# Patient Record
Sex: FEMALE | Race: BLACK OR AFRICAN AMERICAN | NOT HISPANIC OR LATINO | Employment: OTHER | URBAN - NONMETROPOLITAN AREA
[De-identification: names, ages, dates, MRNs, and addresses within clinical notes are randomized per-mention and may not be internally consistent; named-entity substitution may affect disease eponyms.]

---

## 2017-07-25 PROBLEM — I35.0 AORTIC VALVE STENOSIS: Status: ACTIVE | Noted: 2017-07-25

## 2018-01-18 PROBLEM — R55 SYNCOPE: Status: ACTIVE | Noted: 2018-01-18

## 2020-05-12 ENCOUNTER — HISTORICAL (OUTPATIENT)
Dept: ADMINISTRATIVE | Facility: HOSPITAL | Age: 85
End: 2020-05-12

## 2020-05-12 LAB
ALBUMIN SERPL BCP-MCNC: 3.1 G/DL (ref 3.5–5)
ALBUMIN/GLOB SERPL ELPH: 0.7 {RATIO} (ref 1.5–2.2)
ALP SERPL-CCNC: 98 U/L (ref 45–117)
ALT SERPL W P-5'-P-CCNC: 45 U/L (ref 13–56)
ANION GAP SERPL CALC-SCNC: 14.2 MEQ/L (ref 10–20)
APPEARANCE, UA: CLEAR
AST SERPL-CCNC: 116 U/L (ref 15–37)
BACTERIA SPEC CULT: NEGATIVE /HPF
BASOPHILS NFR BLD: 0 10 (ref 0–0.1)
BASOPHILS NFR BLD: 0.1 % (ref 0–1.5)
BILIRUB SERPL-MCNC: 0.39 MG/DL (ref 0.2–1)
BILIRUB UR QL STRIP: NEGATIVE MG/DL
BUDDING YEAST: NORMAL /HPF
BUN SERPL-MCNC: 26 MG/DL (ref 7–18)
CALCIUM SERPL-MCNC: 9.2 MG/DL (ref 8.5–10.1)
CASTS, URINE MICROSCOPIC: NEGATIVE /LPF
CHLORIDE SERPL-SCNC: 91 MMOL/L (ref 98–107)
CO2 SERPL-SCNC: 23 MMOL/L (ref 22–32)
COLOR UR: YELLOW
COVID-19 INTERNAL CONTROL: NORMAL
CREAT SERPL-MCNC: 1.68 MG/DL (ref 0.55–1.02)
EGFR: 37 ML/MIN/1.73M
EOSINOPHIL NFR BLD: 0 10 (ref 0–0.7)
EOSINOPHIL NFR BLD: 0.1 % (ref 0–7)
EPITHELIAL, URINE MICROSCOPIC: NEGATIVE /HPF
ERYTHROCYTE [DISTWIDTH] IN BLOOD BY AUTOMATED COUNT: 12.4 % (ref 11.5–14.5)
GLOBULIN: 4.3 G/DL (ref 2.3–3.5)
GLUCOSE (UA): NEGATIVE MG/DL
GLUCOSE SERPL-MCNC: 102 MG/DL (ref 70–99)
GRAN #: 15.83 10 (ref 2–7.5)
GRAN%: 1.6 %
GRAN%: 85.3 % (ref 50–80)
HCT VFR BLD AUTO: 33.3 % (ref 37.7–47.9)
HGB BLD-MCNC: 11.3 G/DL (ref 12.2–16.2)
HGB UR QL STRIP: NEGATIVE ERY/UL
IMMATURE GRANULOCYTES #: 0.3 10
IPF: 4.9
KETONES UR QL STRIP: NEGATIVE MG/DL
LEUKOCYTE ESTERASE UR QL STRIP: NORMAL LEU/UL
LYMPH #: 1.5 10 (ref 1–3.5)
LYMPH%: 7.9 % (ref 12–50)
MCH RBC QN AUTO: 30.9 PG (ref 27–31)
MCHC RBC AUTO-ENTMCNC: 33.9 G% (ref 32–35)
MCV RBC AUTO: 91 FL (ref 80–97)
MONO #: 0.9 10 (ref 0–0.8)
MONO%: 5 % (ref 0–12)
NITRITE UR QL STRIP: NEGATIVE MG/DL
OSMOC: 253 MOSM/KG (ref 275–295)
PH UR STRIP: 5 [PH] (ref 5–7.5)
PMV BLD AUTO: 10 FL (ref 7.4–10.4)
PMV BLD AUTO: 123 10 (ref 142–424)
POTASSIUM SERPL-SCNC: 5.2 MMOL/L (ref 3.5–5.1)
PROT SERPL-MCNC: 7.4 G/DL (ref 6.4–8.2)
PROT UR QL STRIP: NEGATIVE MG/DL
RBC # BLD AUTO: 3.66 M/UL (ref 4.04–5.48)
RBC #/AREA URNS HPF: NEGATIVE /HPF
SARS-COV-2 RNA RESP QL NAA+PROBE: NOT DETECTED
SODIUM BLD-SCNC: 123 MMOL/L (ref 136–145)
SP GR UR STRIP: 1.01 (ref 1–1.03)
SPERM, URINE MICROSCOPIC: NORMAL /HPF
TYPE OF SPECIMEN  (UA): NORMAL
UNCLASSIFIED CRYSTALS, UA: NORMAL /HPF
UROBILINOGEN UR STRIP-ACNC: NORMAL EU/L
WBC # BLD AUTO: 18.6 10 (ref 4–10.2)
WBC #/AREA URNS HPF: 8 /HPF

## 2020-05-13 LAB
ANION GAP SERPL CALC-SCNC: 14.7 MEQ/L (ref 10–20)
BASOPHILS NFR BLD: 0 10 (ref 0–0.1)
BASOPHILS NFR BLD: 0.1 % (ref 0–1.5)
BUN SERPL-MCNC: 30 MG/DL (ref 7–18)
CALCIUM SERPL-MCNC: 8.6 MG/DL (ref 8.5–10.1)
CHLORIDE SERPL-SCNC: 98 MMOL/L (ref 98–107)
CO2 SERPL-SCNC: 21 MMOL/L (ref 22–32)
CREAT SERPL-MCNC: 1.66 MG/DL (ref 0.55–1.02)
EGFR: 38 ML/MIN/1.73M
EOSINOPHIL NFR BLD: 0 10 (ref 0–0.7)
EOSINOPHIL NFR BLD: 0.1 % (ref 0–7)
ERYTHROCYTE [DISTWIDTH] IN BLOOD BY AUTOMATED COUNT: 12.4 % (ref 11.5–14.5)
GLUCOSE SERPL-MCNC: 92 MG/DL (ref 70–99)
GRAN #: 12.66 10 (ref 2–7.5)
GRAN%: 1.4 %
GRAN%: 66.9 % (ref 50–80)
HCT VFR BLD AUTO: 28.6 % (ref 37.7–47.9)
HGB BLD-MCNC: 9.8 G/DL (ref 12.2–16.2)
IMMATURE GRANULOCYTES #: 0.27 10
IPF: 5.7
LYMPH #: 3.8 10 (ref 1–3.5)
LYMPH%: 20.3 % (ref 12–50)
MCH RBC QN AUTO: 31.4 PG (ref 27–31)
MCHC RBC AUTO-ENTMCNC: 34.3 G% (ref 32–35)
MCV RBC AUTO: 91.7 FL (ref 80–97)
MONO #: 2.1 10 (ref 0–0.8)
MONO%: 11.2 % (ref 0–12)
OSMOC: 265 MOSM/KG (ref 275–295)
PMV BLD AUTO: 10.5 FL (ref 7.4–10.4)
PMV BLD AUTO: 114 10 (ref 142–424)
POTASSIUM SERPL-SCNC: 4.7 MMOL/L (ref 3.5–5.1)
RBC # BLD AUTO: 3.12 M/UL (ref 4.04–5.48)
SODIUM BLD-SCNC: 129 MMOL/L (ref 136–145)
WBC # BLD AUTO: 18.9 10 (ref 4–10.2)

## 2020-05-14 LAB
ALBUMIN SERPL BCP-MCNC: 2.6 G/DL (ref 3.5–5)
ALBUMIN/GLOB SERPL ELPH: 0.7 {RATIO} (ref 1.5–2.2)
ALP SERPL-CCNC: 73 U/L (ref 45–117)
ALT SERPL W P-5'-P-CCNC: 39 U/L (ref 13–56)
ANION GAP SERPL CALC-SCNC: 13.4 MEQ/L (ref 10–20)
AST SERPL-CCNC: 103 U/L (ref 15–37)
BASOPHILS NFR BLD: 0 10 (ref 0–0.1)
BASOPHILS NFR BLD: 0.1 % (ref 0–1.5)
BILIRUB SERPL-MCNC: 0.3 MG/DL (ref 0.2–1)
BUN SERPL-MCNC: 28 MG/DL (ref 7–18)
CALCIUM SERPL-MCNC: 9.2 MG/DL (ref 8.5–10.1)
CHLORIDE SERPL-SCNC: 98 MMOL/L (ref 98–107)
CO2 SERPL-SCNC: 23 MMOL/L (ref 22–32)
CREAT SERPL-MCNC: 1.21 MG/DL (ref 0.55–1.02)
EGFR: 55 ML/MIN/1.73M
EOSINOPHIL NFR BLD: 0 10 (ref 0–0.7)
EOSINOPHIL NFR BLD: 0.2 % (ref 0–7)
ERYTHROCYTE [DISTWIDTH] IN BLOOD BY AUTOMATED COUNT: 12.5 % (ref 11.5–14.5)
GLOBULIN: 3.7 G/DL (ref 2.3–3.5)
GLUCOSE SERPL-MCNC: 84 MG/DL (ref 70–99)
GRAN #: 11.16 10 (ref 2–7.5)
GRAN%: 1.2 %
GRAN%: 75 % (ref 50–80)
HCT VFR BLD AUTO: 30.3 % (ref 37.7–47.9)
HGB BLD-MCNC: 10 G/DL (ref 12.2–16.2)
IMMATURE GRANULOCYTES #: 0.18 10
IPF: 5.3
LYMPH #: 2.1 10 (ref 1–3.5)
LYMPH%: 14 % (ref 12–50)
MAGNESIUM SERPL-MCNC: 2.18 MG/DL (ref 1.8–2.4)
MCH RBC QN AUTO: 30.7 PG (ref 27–31)
MCHC RBC AUTO-ENTMCNC: 33 G% (ref 32–35)
MCV RBC AUTO: 92.9 FL (ref 80–97)
MONO #: 1.4 10 (ref 0–0.8)
MONO%: 9.5 % (ref 0–12)
OSMOC: 266 MOSM/KG (ref 275–295)
PMV BLD AUTO: 10.6 FL (ref 7.4–10.4)
PMV BLD AUTO: 104 10 (ref 142–424)
POTASSIUM SERPL-SCNC: 4.4 MMOL/L (ref 3.5–5.1)
PROT SERPL-MCNC: 6.3 G/DL (ref 6.4–8.2)
RBC # BLD AUTO: 3.26 M/UL (ref 4.04–5.48)
SODIUM BLD-SCNC: 130 MMOL/L (ref 136–145)
WBC # BLD AUTO: 14.9 10 (ref 4–10.2)

## 2020-05-15 LAB
ALBUMIN SERPL BCP-MCNC: 2.5 G/DL (ref 3.5–5)
ALBUMIN/GLOB SERPL ELPH: 0.7 {RATIO} (ref 1.5–2.2)
ALP SERPL-CCNC: 72 U/L (ref 45–117)
ALT SERPL W P-5'-P-CCNC: 37 U/L (ref 13–56)
ANION GAP SERPL CALC-SCNC: 12.6 MEQ/L (ref 10–20)
AST SERPL-CCNC: 75 U/L (ref 15–37)
BASOPHILS NFR BLD: 0 10 (ref 0–0.1)
BASOPHILS NFR BLD: 0.1 % (ref 0–1.5)
BILIRUB SERPL-MCNC: 0.3 MG/DL (ref 0.2–1)
BUN SERPL-MCNC: 28 MG/DL (ref 7–18)
CALCIUM SERPL-MCNC: 8.6 MG/DL (ref 8.5–10.1)
CHLORIDE SERPL-SCNC: 99 MMOL/L (ref 98–107)
CO2 SERPL-SCNC: 22 MMOL/L (ref 22–32)
CREAT SERPL-MCNC: 1.2 MG/DL (ref 0.55–1.02)
EGFR: 55 ML/MIN/1.73M
EOSINOPHIL NFR BLD: 0 10 (ref 0–0.7)
EOSINOPHIL NFR BLD: 0.2 % (ref 0–7)
ERYTHROCYTE [DISTWIDTH] IN BLOOD BY AUTOMATED COUNT: 12.6 % (ref 11.5–14.5)
GLOBULIN: 3.6 G/DL (ref 2.3–3.5)
GLUCOSE SERPL-MCNC: 86 MG/DL (ref 70–99)
GRAN #: 10.58 10 (ref 2–7.5)
GRAN%: 1.1 %
GRAN%: 71.4 % (ref 50–80)
HCT VFR BLD AUTO: 28.9 % (ref 37.7–47.9)
HGB BLD-MCNC: 9.6 G/DL (ref 12.2–16.2)
IMMATURE GRANULOCYTES #: 0.17 10
IPF: 4.2
LYMPH #: 2.4 10 (ref 1–3.5)
LYMPH%: 16.5 % (ref 12–50)
MAGNESIUM SERPL-MCNC: 2.06 MG/DL (ref 1.8–2.4)
MCH RBC QN AUTO: 30.9 PG (ref 27–31)
MCHC RBC AUTO-ENTMCNC: 33.2 G% (ref 32–35)
MCV RBC AUTO: 92.9 FL (ref 80–97)
MONO #: 1.6 10 (ref 0–0.8)
MONO%: 10.7 % (ref 0–12)
OSMOC: 264 MOSM/KG (ref 275–295)
PMV BLD AUTO: 10.9 FL (ref 7.4–10.4)
PMV BLD AUTO: 105 10 (ref 142–424)
POTASSIUM SERPL-SCNC: 4.6 MMOL/L (ref 3.5–5.1)
PROT SERPL-MCNC: 6.1 G/DL (ref 6.4–8.2)
RBC # BLD AUTO: 3.11 M/UL (ref 4.04–5.48)
SODIUM BLD-SCNC: 129 MMOL/L (ref 136–145)
WBC # BLD AUTO: 14.8 10 (ref 4–10.2)

## 2020-05-16 LAB
ALBUMIN SERPL BCP-MCNC: 2.3 G/DL (ref 3.5–5)
ALBUMIN/GLOB SERPL ELPH: 0.7 {RATIO} (ref 1.5–2.2)
ALP SERPL-CCNC: 65 U/L (ref 45–117)
ALT SERPL W P-5'-P-CCNC: 38 U/L (ref 13–56)
ANION GAP SERPL CALC-SCNC: 11.6 MEQ/L (ref 10–20)
AST SERPL-CCNC: 60 U/L (ref 15–37)
BASOPHILS NFR BLD: 0 10 (ref 0–0.1)
BASOPHILS NFR BLD: 0.2 % (ref 0–1.5)
BILIRUB SERPL-MCNC: 0.32 MG/DL (ref 0.2–1)
BUN SERPL-MCNC: 26 MG/DL (ref 7–18)
CALCIUM SERPL-MCNC: 8.5 MG/DL (ref 8.5–10.1)
CHLORIDE SERPL-SCNC: 103 MMOL/L (ref 98–107)
CO2 SERPL-SCNC: 23 MMOL/L (ref 22–32)
CREAT SERPL-MCNC: 1.09 MG/DL (ref 0.55–1.02)
EGFR: 62 ML/MIN/1.73M
EOSINOPHIL NFR BLD: 0 10 (ref 0–0.7)
EOSINOPHIL NFR BLD: 0.2 % (ref 0–7)
ERYTHROCYTE [DISTWIDTH] IN BLOOD BY AUTOMATED COUNT: 12.7 % (ref 11.5–14.5)
GLOBULIN: 3.4 G/DL (ref 2.3–3.5)
GLUCOSE SERPL-MCNC: 78 MG/DL (ref 70–99)
GRAN #: 8.68 10 (ref 2–7.5)
GRAN%: 1.7 %
GRAN%: 70.9 % (ref 50–80)
HCT VFR BLD AUTO: 26.8 % (ref 37.7–47.9)
HGB BLD-MCNC: 8.9 G/DL (ref 12.2–16.2)
IMMATURE GRANULOCYTES #: 0.21 10
IPF: 3.6
LYMPH #: 2 10 (ref 1–3.5)
LYMPH%: 16.6 % (ref 12–50)
MAGNESIUM SERPL-MCNC: 2.17 MG/DL (ref 1.8–2.4)
MCH RBC QN AUTO: 31.2 PG (ref 27–31)
MCHC RBC AUTO-ENTMCNC: 33.2 G% (ref 32–35)
MCV RBC AUTO: 94 FL (ref 80–97)
MONO #: 1.3 10 (ref 0–0.8)
MONO%: 10.4 % (ref 0–12)
OSMOC: 270 MOSM/KG (ref 275–295)
PMV BLD AUTO: 10.8 FL (ref 7.4–10.4)
PMV BLD AUTO: 97 10 (ref 142–424)
POTASSIUM SERPL-SCNC: 4.6 MMOL/L (ref 3.5–5.1)
PROT SERPL-MCNC: 5.7 G/DL (ref 6.4–8.2)
RBC # BLD AUTO: 2.85 M/UL (ref 4.04–5.48)
SODIUM BLD-SCNC: 133 MMOL/L (ref 136–145)
WBC # BLD AUTO: 12.3 10 (ref 4–10.2)

## 2020-09-11 ENCOUNTER — APPOINTMENT (OUTPATIENT)
Dept: LAB | Facility: HOSPITAL | Age: 85
End: 2020-09-11
Attending: PODIATRIST
Payer: MEDICARE

## 2020-09-11 DIAGNOSIS — T81.30XA WOUND DISRUPTION: Primary | ICD-10-CM

## 2020-09-11 DIAGNOSIS — I10 ESSENTIAL HYPERTENSION, MALIGNANT: ICD-10-CM

## 2020-09-11 PROCEDURE — 87070 CULTURE OTHR SPECIMN AEROBIC: CPT

## 2020-09-14 LAB — BACTERIA SPEC AEROBE CULT: NORMAL

## 2020-11-21 ENCOUNTER — APPOINTMENT (OUTPATIENT)
Dept: LAB | Facility: HOSPITAL | Age: 85
End: 2020-11-21
Attending: INTERNAL MEDICINE
Payer: MEDICARE

## 2020-11-21 DIAGNOSIS — R30.0 DYSURIA: Primary | ICD-10-CM

## 2020-11-21 LAB
BACTERIA #/AREA URNS HPF: NEGATIVE /HPF
BILIRUB UR QL STRIP: NEGATIVE
CLARITY UR: CLEAR
COLOR UR: YELLOW
GLUCOSE UR QL STRIP: NEGATIVE
HGB UR QL STRIP: NEGATIVE
HYALINE CASTS #/AREA URNS LPF: 0 /LPF
KETONES UR QL STRIP: NEGATIVE
LEUKOCYTE ESTERASE UR QL STRIP: ABNORMAL
MICROSCOPIC COMMENT: ABNORMAL
NITRITE UR QL STRIP: NEGATIVE
PH UR STRIP: 6 [PH] (ref 5–8)
PROT UR QL STRIP: NEGATIVE
RBC #/AREA URNS HPF: 1 /HPF (ref 0–4)
SP GR UR STRIP: 1.01 (ref 1–1.03)
SQUAMOUS #/AREA URNS HPF: 3 /HPF
URN SPEC COLLECT METH UR: ABNORMAL
UROBILINOGEN UR STRIP-ACNC: 1 EU/DL
WBC #/AREA URNS HPF: 24 /HPF (ref 0–5)

## 2020-11-21 PROCEDURE — 81000 URINALYSIS NONAUTO W/SCOPE: CPT

## 2020-11-30 ENCOUNTER — HOSPITAL ENCOUNTER (EMERGENCY)
Facility: HOSPITAL | Age: 85
Discharge: HOME | End: 2020-11-30
Attending: EMERGENCY MEDICINE
Payer: MEDICARE

## 2020-11-30 VITALS
RESPIRATION RATE: 18 BRPM | HEIGHT: 64 IN | OXYGEN SATURATION: 99 % | SYSTOLIC BLOOD PRESSURE: 138 MMHG | DIASTOLIC BLOOD PRESSURE: 65 MMHG | WEIGHT: 115 LBS | TEMPERATURE: 98 F | HEART RATE: 69 BPM | BODY MASS INDEX: 19.63 KG/M2

## 2020-11-30 DIAGNOSIS — E87.1 CHRONIC HYPONATREMIA: ICD-10-CM

## 2020-11-30 DIAGNOSIS — E86.0 DEHYDRATION: Primary | ICD-10-CM

## 2020-11-30 LAB
ALBUMIN SERPL BCP-MCNC: 2.4 G/DL (ref 3.5–5.2)
ALP SERPL-CCNC: 67 U/L (ref 55–135)
ALT SERPL W/O P-5'-P-CCNC: 21 U/L (ref 10–44)
ANION GAP SERPL CALC-SCNC: 6 MMOL/L (ref 8–16)
AST SERPL-CCNC: 19 U/L (ref 10–40)
BACTERIA #/AREA URNS HPF: NEGATIVE /HPF
BASOPHILS # BLD AUTO: 0.03 K/UL (ref 0–0.2)
BASOPHILS NFR BLD: 0.3 % (ref 0–1.9)
BILIRUB SERPL-MCNC: 0.3 MG/DL (ref 0.1–1)
BILIRUB UR QL STRIP: NEGATIVE
BUN SERPL-MCNC: 16 MG/DL (ref 8–23)
CALCIUM SERPL-MCNC: 8.7 MG/DL (ref 8.7–10.5)
CHLORIDE SERPL-SCNC: 93 MMOL/L (ref 95–110)
CLARITY UR: CLEAR
CO2 SERPL-SCNC: 27 MMOL/L (ref 23–29)
COLOR UR: YELLOW
CREAT SERPL-MCNC: 1 MG/DL (ref 0.5–1.4)
DIFFERENTIAL METHOD: ABNORMAL
EOSINOPHIL # BLD AUTO: 0.1 K/UL (ref 0–0.5)
EOSINOPHIL NFR BLD: 0.4 % (ref 0–8)
ERYTHROCYTE [DISTWIDTH] IN BLOOD BY AUTOMATED COUNT: 13.3 % (ref 11.5–14.5)
EST. GFR  (AFRICAN AMERICAN): 59.8 ML/MIN/1.73 M^2
EST. GFR  (NON AFRICAN AMERICAN): 51.9 ML/MIN/1.73 M^2
GLUCOSE SERPL-MCNC: 103 MG/DL (ref 70–110)
GLUCOSE UR QL STRIP: NEGATIVE
HCT VFR BLD AUTO: 32.8 % (ref 37–48.5)
HGB BLD-MCNC: 10.7 G/DL (ref 12–16)
HGB UR QL STRIP: NEGATIVE
HYALINE CASTS #/AREA URNS LPF: 0 /LPF
IMM GRANULOCYTES # BLD AUTO: 0.28 K/UL (ref 0–0.04)
IMM GRANULOCYTES NFR BLD AUTO: 2.4 % (ref 0–0.5)
KETONES UR QL STRIP: NEGATIVE
LEUKOCYTE ESTERASE UR QL STRIP: ABNORMAL
LYMPHOCYTES # BLD AUTO: 1.9 K/UL (ref 1–4.8)
LYMPHOCYTES NFR BLD: 16.3 % (ref 18–48)
MCH RBC QN AUTO: 28.7 PG (ref 27–31)
MCHC RBC AUTO-ENTMCNC: 32.6 G/DL (ref 32–36)
MCV RBC AUTO: 88 FL (ref 82–98)
MICROSCOPIC COMMENT: ABNORMAL
MONOCYTES # BLD AUTO: 0.7 K/UL (ref 0.3–1)
MONOCYTES NFR BLD: 6 % (ref 4–15)
NEUTROPHILS # BLD AUTO: 8.7 K/UL (ref 1.8–7.7)
NEUTROPHILS NFR BLD: 74.6 % (ref 38–73)
NITRITE UR QL STRIP: NEGATIVE
NRBC BLD-RTO: 0 /100 WBC
PH UR STRIP: >8 [PH] (ref 5–8)
PLATELET # BLD AUTO: 190 K/UL (ref 150–350)
PMV BLD AUTO: 9.7 FL (ref 9.2–12.9)
POTASSIUM SERPL-SCNC: 3.9 MMOL/L (ref 3.5–5.1)
PROT SERPL-MCNC: 6.2 G/DL (ref 6–8.4)
PROT UR QL STRIP: NEGATIVE
RBC # BLD AUTO: 3.73 M/UL (ref 4–5.4)
RBC #/AREA URNS HPF: 4 /HPF (ref 0–4)
SODIUM SERPL-SCNC: 126 MMOL/L (ref 136–145)
SP GR UR STRIP: 1.01 (ref 1–1.03)
SQUAMOUS #/AREA URNS HPF: 1 /HPF
URN SPEC COLLECT METH UR: ABNORMAL
UROBILINOGEN UR STRIP-ACNC: NEGATIVE EU/DL
WBC # BLD AUTO: 11.59 K/UL (ref 3.9–12.7)
WBC #/AREA URNS HPF: 11 /HPF (ref 0–5)

## 2020-11-30 PROCEDURE — 99284 EMERGENCY DEPT VISIT MOD MDM: CPT | Mod: 25

## 2020-11-30 PROCEDURE — 87086 URINE CULTURE/COLONY COUNT: CPT

## 2020-11-30 PROCEDURE — 96360 HYDRATION IV INFUSION INIT: CPT

## 2020-11-30 PROCEDURE — 85025 COMPLETE CBC W/AUTO DIFF WBC: CPT

## 2020-11-30 PROCEDURE — 25000003 PHARM REV CODE 250: Performed by: CLINICAL NURSE SPECIALIST

## 2020-11-30 PROCEDURE — 36415 COLL VENOUS BLD VENIPUNCTURE: CPT

## 2020-11-30 PROCEDURE — 80053 COMPREHEN METABOLIC PANEL: CPT

## 2020-11-30 PROCEDURE — 81000 URINALYSIS NONAUTO W/SCOPE: CPT

## 2020-11-30 RX ORDER — ASPIRIN AND DIPYRIDAMOLE 25; 200 MG/1; MG/1
1 CAPSULE, EXTENDED RELEASE ORAL 2 TIMES DAILY
COMMUNITY
End: 2022-01-01 | Stop reason: SDUPTHER

## 2020-11-30 RX ORDER — ATORVASTATIN CALCIUM 40 MG/1
40 TABLET, FILM COATED ORAL DAILY
COMMUNITY
End: 2022-01-01

## 2020-11-30 RX ORDER — CEFUROXIME AXETIL 250 MG/1
250 TABLET ORAL 2 TIMES DAILY
COMMUNITY
End: 2022-01-01 | Stop reason: SDUPTHER

## 2020-11-30 RX ORDER — VENLAFAXINE 37.5 MG/1
TABLET ORAL 2 TIMES DAILY
COMMUNITY

## 2020-11-30 RX ADMIN — SODIUM CHLORIDE 1000 ML: 0.9 INJECTION, SOLUTION INTRAVENOUS at 11:11

## 2020-11-30 NOTE — ED PROVIDER NOTES
Encounter Date: 11/30/2020       History     Chief Complaint   Patient presents with    Weakness     for a week, on abx for UTI, pt is aaox3, unable to stand, denies pain, vss, family feels that she is dehydrated     85-year-old female presents to the ER with her son for complaints of weakness, fatigue for the last week.   has her on medication for UTI (cefuroxime).  Patient has a feeding tube status seeing care by the son and daughter.  Tolerating feeding.  Reporting some diarrhea.  Vital signs stable, no complaints from patient        Review of patient's allergies indicates:   Allergen Reactions    Aspirin Nausea Only     Past Medical History:   Diagnosis Date    Anemia     Anticoagulant long-term use     Anxiety     Aortic stenosis     Arthritis     Bilateral carotid artery disease     Depression     Encounter for blood transfusion     Hemorrhoids     TIA (transient ischemic attack)      Past Surgical History:   Procedure Laterality Date    HYSTERECTOMY       Family History   Problem Relation Age of Onset    Cancer Mother      Social History     Tobacco Use    Smoking status: Former Smoker    Smokeless tobacco: Never Used   Substance Use Topics    Alcohol use: No    Drug use: No     Review of Systems   Constitutional: Positive for activity change, appetite change and fatigue. Negative for fever.   HENT: Negative for sore throat.    Respiratory: Negative for shortness of breath.    Cardiovascular: Negative for chest pain.   Gastrointestinal: Positive for diarrhea. Negative for nausea.   Genitourinary: Negative for dysuria.   Musculoskeletal: Positive for arthralgias and gait problem. Negative for back pain.   Skin: Negative for rash.   Neurological: Positive for weakness.   Hematological: Does not bruise/bleed easily.   All other systems reviewed and are negative.      Physical Exam     Initial Vitals [11/30/20 1058]   BP Pulse Resp Temp SpO2   139/63 87 16 97.9 °F (36.6 °C) 99 %       MAP       --         Physical Exam    Nursing note and vitals reviewed.  Constitutional: She appears well-developed and well-nourished.   HENT:   Head: Normocephalic and atraumatic.   Eyes: Pupils are equal, round, and reactive to light.   Neck: Normal range of motion.   Cardiovascular: Normal rate and regular rhythm.   Pulmonary/Chest: Breath sounds normal.   Abdominal: Soft. Bowel sounds are normal.   Musculoskeletal: Normal range of motion.   Neurological: She is alert and oriented to person, place, and time.   Skin: Skin is warm and dry.   Psychiatric: She has a normal mood and affect.         ED Course   Procedures  Labs Reviewed   URINALYSIS, REFLEX TO URINE CULTURE - Abnormal; Notable for the following components:       Result Value    pH, UA >8.0 (*)     Leukocytes, UA 2+ (*)     All other components within normal limits    Narrative:     Specimen Source->Urine   CBC W/ AUTO DIFFERENTIAL - Abnormal; Notable for the following components:    RBC 3.73 (*)     Hemoglobin 10.7 (*)     Hematocrit 32.8 (*)     Immature Granulocytes 2.4 (*)     Gran # (ANC) 8.7 (*)     Immature Grans (Abs) 0.28 (*)     Gran % 74.6 (*)     Lymph % 16.3 (*)     All other components within normal limits   COMPREHENSIVE METABOLIC PANEL - Abnormal; Notable for the following components:    Sodium 126 (*)     Chloride 93 (*)     Albumin 2.4 (*)     Anion Gap 6 (*)     eGFR if  59.8 (*)     eGFR if non  51.9 (*)     All other components within normal limits   URINALYSIS MICROSCOPIC - Abnormal; Notable for the following components:    WBC, UA 11 (*)     All other components within normal limits    Narrative:     Specimen Source->Urine   CULTURE, URINE          Imaging Results    None          Medical Decision Making:   Differential Diagnosis:   Dehydration, UTI  Clinical Tests:   Lab Tests: Ordered and Reviewed                             Clinical Impression:     ICD-10-CM ICD-9-CM   1. Dehydration  E86.0  276.51   2. Chronic hyponatremia  E87.1 276.1                          ED Disposition Condition    Discharge Stable        ED Prescriptions     None        Follow-up Information    None                                      Maryellen Diez NP  11/30/20 5103

## 2020-12-02 LAB — BACTERIA UR CULT: NO GROWTH

## 2020-12-29 ENCOUNTER — APPOINTMENT (OUTPATIENT)
Dept: LAB | Facility: HOSPITAL | Age: 85
End: 2020-12-29
Attending: INTERNAL MEDICINE
Payer: MEDICARE

## 2020-12-29 DIAGNOSIS — N39.0 URINARY TRACT INFECTION, SITE NOT SPECIFIED: Primary | ICD-10-CM

## 2020-12-29 LAB
BACTERIA #/AREA URNS HPF: NEGATIVE /HPF
BILIRUB UR QL STRIP: NEGATIVE
CLARITY UR: CLEAR
COLOR UR: YELLOW
GLUCOSE UR QL STRIP: NEGATIVE
HGB UR QL STRIP: NEGATIVE
HYALINE CASTS #/AREA URNS LPF: 0 /LPF
KETONES UR QL STRIP: NEGATIVE
LEUKOCYTE ESTERASE UR QL STRIP: ABNORMAL
MICROSCOPIC COMMENT: ABNORMAL
NITRITE UR QL STRIP: NEGATIVE
PH UR STRIP: >8 [PH] (ref 5–8)
PROT UR QL STRIP: NEGATIVE
RBC #/AREA URNS HPF: 2 /HPF (ref 0–4)
SP GR UR STRIP: 1.01 (ref 1–1.03)
SQUAMOUS #/AREA URNS HPF: 1 /HPF
URN SPEC COLLECT METH UR: ABNORMAL
UROBILINOGEN UR STRIP-ACNC: 1 EU/DL
WBC #/AREA URNS HPF: 7 /HPF (ref 0–5)

## 2020-12-29 PROCEDURE — 81000 URINALYSIS NONAUTO W/SCOPE: CPT

## 2021-04-14 ENCOUNTER — APPOINTMENT (OUTPATIENT)
Dept: LAB | Facility: HOSPITAL | Age: 86
End: 2021-04-14
Attending: INTERNAL MEDICINE
Payer: MEDICARE

## 2021-04-14 DIAGNOSIS — N39.0 URINARY TRACT INFECTION, SITE NOT SPECIFIED: Primary | ICD-10-CM

## 2021-04-14 LAB
BACTERIA #/AREA URNS HPF: NEGATIVE /HPF
BILIRUB UR QL STRIP: NEGATIVE
CLARITY UR: CLEAR
COLOR UR: YELLOW
GLUCOSE UR QL STRIP: NEGATIVE
HGB UR QL STRIP: NEGATIVE
HYALINE CASTS #/AREA URNS LPF: 0 /LPF
KETONES UR QL STRIP: NEGATIVE
LEUKOCYTE ESTERASE UR QL STRIP: ABNORMAL
MICROSCOPIC COMMENT: ABNORMAL
NITRITE UR QL STRIP: NEGATIVE
PH UR STRIP: 8 [PH] (ref 5–8)
PROT UR QL STRIP: NEGATIVE
RBC #/AREA URNS HPF: 4 /HPF (ref 0–4)
SP GR UR STRIP: 1.01 (ref 1–1.03)
SQUAMOUS #/AREA URNS HPF: 1 /HPF
URN SPEC COLLECT METH UR: ABNORMAL
UROBILINOGEN UR STRIP-ACNC: NEGATIVE EU/DL
WBC #/AREA URNS HPF: 9 /HPF (ref 0–5)

## 2021-04-14 PROCEDURE — 81000 URINALYSIS NONAUTO W/SCOPE: CPT | Performed by: INTERNAL MEDICINE

## 2021-04-14 PROCEDURE — 87086 URINE CULTURE/COLONY COUNT: CPT | Performed by: INTERNAL MEDICINE

## 2021-04-16 LAB — BACTERIA UR CULT: NO GROWTH

## 2021-05-18 ENCOUNTER — APPOINTMENT (OUTPATIENT)
Dept: LAB | Facility: HOSPITAL | Age: 86
End: 2021-05-18
Attending: INTERNAL MEDICINE
Payer: MEDICARE

## 2021-05-18 DIAGNOSIS — N39.0 URINARY TRACT INFECTION, SITE NOT SPECIFIED: Primary | ICD-10-CM

## 2021-05-18 LAB
BACTERIA #/AREA URNS HPF: NEGATIVE /HPF
BILIRUB UR QL STRIP: NEGATIVE
CLARITY UR: CLEAR
COLOR UR: YELLOW
GLUCOSE UR QL STRIP: NEGATIVE
HGB UR QL STRIP: NEGATIVE
HYALINE CASTS #/AREA URNS LPF: 1 /LPF
KETONES UR QL STRIP: NEGATIVE
LEUKOCYTE ESTERASE UR QL STRIP: ABNORMAL
MICROSCOPIC COMMENT: ABNORMAL
NITRITE UR QL STRIP: NEGATIVE
PH UR STRIP: 6 [PH] (ref 5–8)
PROT UR QL STRIP: NEGATIVE
RBC #/AREA URNS HPF: 1 /HPF (ref 0–4)
SP GR UR STRIP: 1.01 (ref 1–1.03)
SQUAMOUS #/AREA URNS HPF: 1 /HPF
URN SPEC COLLECT METH UR: ABNORMAL
UROBILINOGEN UR STRIP-ACNC: NEGATIVE EU/DL
WBC #/AREA URNS HPF: 34 /HPF (ref 0–5)

## 2021-05-18 PROCEDURE — 87086 URINE CULTURE/COLONY COUNT: CPT | Performed by: INTERNAL MEDICINE

## 2021-05-18 PROCEDURE — 81000 URINALYSIS NONAUTO W/SCOPE: CPT | Performed by: INTERNAL MEDICINE

## 2021-05-19 LAB — BACTERIA UR CULT: NO GROWTH

## 2021-05-25 ENCOUNTER — LAB VISIT (OUTPATIENT)
Dept: LAB | Facility: HOSPITAL | Age: 86
End: 2021-05-25
Attending: INTERNAL MEDICINE
Payer: MEDICARE

## 2021-05-25 DIAGNOSIS — I10 ESSENTIAL HYPERTENSION, MALIGNANT: Primary | ICD-10-CM

## 2021-05-25 DIAGNOSIS — E87.6 HYPOPOTASSEMIA: ICD-10-CM

## 2021-05-25 DIAGNOSIS — D64.9 ANEMIA, UNSPECIFIED: ICD-10-CM

## 2021-05-25 DIAGNOSIS — E55.9 AVITAMINOSIS D: ICD-10-CM

## 2021-05-25 DIAGNOSIS — E78.00 PURE HYPERCHOLESTEROLEMIA: ICD-10-CM

## 2021-05-25 LAB
25(OH)D3+25(OH)D2 SERPL-MCNC: 49 NG/ML (ref 30–96)
ALBUMIN SERPL BCP-MCNC: 3.3 G/DL (ref 3.5–5.2)
ALP SERPL-CCNC: 73 U/L (ref 55–135)
ALT SERPL W/O P-5'-P-CCNC: 38 U/L (ref 10–44)
ANION GAP SERPL CALC-SCNC: 9 MMOL/L (ref 8–16)
AST SERPL-CCNC: 43 U/L (ref 10–40)
BASOPHILS # BLD AUTO: 0.02 K/UL (ref 0–0.2)
BASOPHILS NFR BLD: 0.2 % (ref 0–1.9)
BILIRUB SERPL-MCNC: 0.5 MG/DL (ref 0.1–1)
BUN SERPL-MCNC: 23 MG/DL (ref 8–23)
CALCIUM SERPL-MCNC: 9.4 MG/DL (ref 8.7–10.5)
CHLORIDE SERPL-SCNC: 94 MMOL/L (ref 95–110)
CHOLEST SERPL-MCNC: 220 MG/DL (ref 120–199)
CHOLEST/HDLC SERPL: 2.5 {RATIO} (ref 2–5)
CO2 SERPL-SCNC: 26 MMOL/L (ref 23–29)
CREAT SERPL-MCNC: 1.3 MG/DL (ref 0.5–1.4)
DIFFERENTIAL METHOD: ABNORMAL
EOSINOPHIL # BLD AUTO: 0 K/UL (ref 0–0.5)
EOSINOPHIL NFR BLD: 0.1 % (ref 0–8)
ERYTHROCYTE [DISTWIDTH] IN BLOOD BY AUTOMATED COUNT: 14.9 % (ref 11.5–14.5)
EST. GFR  (AFRICAN AMERICAN): 43.2 ML/MIN/1.73 M^2
EST. GFR  (NON AFRICAN AMERICAN): 37.5 ML/MIN/1.73 M^2
GLUCOSE SERPL-MCNC: 104 MG/DL (ref 70–110)
HCT VFR BLD AUTO: 38.3 % (ref 37–48.5)
HDLC SERPL-MCNC: 89 MG/DL (ref 40–75)
HDLC SERPL: 40.5 % (ref 20–50)
HGB BLD-MCNC: 12.7 G/DL (ref 12–16)
IMM GRANULOCYTES # BLD AUTO: 0.19 K/UL (ref 0–0.04)
IMM GRANULOCYTES NFR BLD AUTO: 1.9 % (ref 0–0.5)
LDLC SERPL CALC-MCNC: 102.4 MG/DL (ref 63–159)
LYMPHOCYTES # BLD AUTO: 1.4 K/UL (ref 1–4.8)
LYMPHOCYTES NFR BLD: 13.9 % (ref 18–48)
MCH RBC QN AUTO: 29.7 PG (ref 27–31)
MCHC RBC AUTO-ENTMCNC: 33.2 G/DL (ref 32–36)
MCV RBC AUTO: 90 FL (ref 82–98)
MONOCYTES # BLD AUTO: 0.9 K/UL (ref 0.3–1)
MONOCYTES NFR BLD: 9.2 % (ref 4–15)
NEUTROPHILS # BLD AUTO: 7.3 K/UL (ref 1.8–7.7)
NEUTROPHILS NFR BLD: 74.7 % (ref 38–73)
NONHDLC SERPL-MCNC: 131 MG/DL
NRBC BLD-RTO: 0 /100 WBC
PLATELET # BLD AUTO: 110 K/UL (ref 150–450)
PMV BLD AUTO: 11.2 FL (ref 9.2–12.9)
POTASSIUM SERPL-SCNC: 4.3 MMOL/L (ref 3.5–5.1)
PROT SERPL-MCNC: 6.7 G/DL (ref 6–8.4)
RBC # BLD AUTO: 4.27 M/UL (ref 4–5.4)
SODIUM SERPL-SCNC: 129 MMOL/L (ref 136–145)
T4 FREE SERPL-MCNC: 1.52 NG/DL (ref 0.71–1.51)
TRIGL SERPL-MCNC: 143 MG/DL (ref 30–150)
TSH SERPL DL<=0.005 MIU/L-ACNC: 4.09 UIU/ML (ref 0.4–4)
WBC # BLD AUTO: 9.77 K/UL (ref 3.9–12.7)

## 2021-05-25 PROCEDURE — 36415 COLL VENOUS BLD VENIPUNCTURE: CPT | Performed by: INTERNAL MEDICINE

## 2021-05-25 PROCEDURE — 82306 VITAMIN D 25 HYDROXY: CPT | Performed by: INTERNAL MEDICINE

## 2021-05-25 PROCEDURE — 85025 COMPLETE CBC W/AUTO DIFF WBC: CPT | Performed by: INTERNAL MEDICINE

## 2021-05-25 PROCEDURE — 80053 COMPREHEN METABOLIC PANEL: CPT | Performed by: INTERNAL MEDICINE

## 2021-05-25 PROCEDURE — 84443 ASSAY THYROID STIM HORMONE: CPT | Performed by: INTERNAL MEDICINE

## 2021-05-25 PROCEDURE — 80061 LIPID PANEL: CPT | Performed by: INTERNAL MEDICINE

## 2021-05-25 PROCEDURE — 84439 ASSAY OF FREE THYROXINE: CPT | Performed by: INTERNAL MEDICINE

## 2021-07-14 ENCOUNTER — APPOINTMENT (OUTPATIENT)
Dept: LAB | Facility: HOSPITAL | Age: 86
End: 2021-07-14
Attending: INTERNAL MEDICINE
Payer: MEDICARE

## 2021-07-14 DIAGNOSIS — N39.0 URINARY TRACT INFECTION, SITE NOT SPECIFIED: Primary | ICD-10-CM

## 2021-07-14 LAB
BACTERIA #/AREA URNS HPF: ABNORMAL /HPF
BILIRUB UR QL STRIP: NEGATIVE
CLARITY UR: ABNORMAL
COLOR UR: YELLOW
GLUCOSE UR QL STRIP: NEGATIVE
HGB UR QL STRIP: ABNORMAL
HYALINE CASTS #/AREA URNS LPF: 0 /LPF
KETONES UR QL STRIP: NEGATIVE
LEUKOCYTE ESTERASE UR QL STRIP: ABNORMAL
MICROSCOPIC COMMENT: ABNORMAL
NITRITE UR QL STRIP: NEGATIVE
PH UR STRIP: 8 [PH] (ref 5–8)
PROT UR QL STRIP: NEGATIVE
RBC #/AREA URNS HPF: 3 /HPF (ref 0–4)
SP GR UR STRIP: 1.01 (ref 1–1.03)
SQUAMOUS #/AREA URNS HPF: 0 /HPF
URN SPEC COLLECT METH UR: ABNORMAL
UROBILINOGEN UR STRIP-ACNC: 1 EU/DL
WBC #/AREA URNS HPF: 81 /HPF (ref 0–5)

## 2021-07-14 PROCEDURE — 81000 URINALYSIS NONAUTO W/SCOPE: CPT | Performed by: INTERNAL MEDICINE

## 2021-07-14 PROCEDURE — 87086 URINE CULTURE/COLONY COUNT: CPT | Performed by: INTERNAL MEDICINE

## 2021-07-16 LAB — BACTERIA UR CULT: NO GROWTH

## 2022-01-01 ENCOUNTER — TELEPHONE (OUTPATIENT)
Dept: GASTROENTEROLOGY | Facility: CLINIC | Age: 87
End: 2022-01-01
Payer: MEDICARE

## 2022-01-01 ENCOUNTER — APPOINTMENT (OUTPATIENT)
Dept: LAB | Facility: HOSPITAL | Age: 87
End: 2022-01-01
Attending: INTERNAL MEDICINE
Payer: MEDICARE

## 2022-01-01 ENCOUNTER — HOSPITAL ENCOUNTER (INPATIENT)
Facility: HOSPITAL | Age: 87
LOS: 1 days | Discharge: HOME HEALTH | DRG: 378 | End: 2022-09-30
Attending: STUDENT IN AN ORGANIZED HEALTH CARE EDUCATION/TRAINING PROGRAM | Admitting: STUDENT IN AN ORGANIZED HEALTH CARE EDUCATION/TRAINING PROGRAM
Payer: MEDICARE

## 2022-01-01 ENCOUNTER — OFFICE VISIT (OUTPATIENT)
Dept: PALLIATIVE MEDICINE | Facility: CLINIC | Age: 87
End: 2022-01-01
Payer: MEDICARE

## 2022-01-01 ENCOUNTER — ANESTHESIA EVENT (OUTPATIENT)
Dept: ENDOSCOPY | Facility: HOSPITAL | Age: 87
DRG: 378 | End: 2022-01-01
Payer: MEDICARE

## 2022-01-01 ENCOUNTER — PATIENT OUTREACH (OUTPATIENT)
Dept: ADMINISTRATIVE | Facility: CLINIC | Age: 87
End: 2022-01-01
Payer: MEDICARE

## 2022-01-01 ENCOUNTER — HOSPITAL ENCOUNTER (EMERGENCY)
Facility: HOSPITAL | Age: 87
Discharge: OTHER INSTITUTION | End: 2022-09-29
Attending: STUDENT IN AN ORGANIZED HEALTH CARE EDUCATION/TRAINING PROGRAM
Payer: MEDICARE

## 2022-01-01 ENCOUNTER — HOSPITAL ENCOUNTER (EMERGENCY)
Facility: HOSPITAL | Age: 87
Discharge: HOME | End: 2022-09-02
Attending: EMERGENCY MEDICINE
Payer: MEDICARE

## 2022-01-01 ENCOUNTER — ANESTHESIA (OUTPATIENT)
Dept: ENDOSCOPY | Facility: HOSPITAL | Age: 87
DRG: 378 | End: 2022-01-01
Payer: MEDICARE

## 2022-01-01 VITALS
HEART RATE: 72 BPM | TEMPERATURE: 98 F | DIASTOLIC BLOOD PRESSURE: 59 MMHG | RESPIRATION RATE: 18 BRPM | OXYGEN SATURATION: 100 % | SYSTOLIC BLOOD PRESSURE: 114 MMHG | BODY MASS INDEX: 15.79 KG/M2 | WEIGHT: 92 LBS

## 2022-01-01 VITALS
TEMPERATURE: 98 F | TEMPERATURE: 98 F | DIASTOLIC BLOOD PRESSURE: 58 MMHG | RESPIRATION RATE: 18 BRPM | BODY MASS INDEX: 15.32 KG/M2 | SYSTOLIC BLOOD PRESSURE: 129 MMHG | RESPIRATION RATE: 16 BRPM | OXYGEN SATURATION: 99 % | SYSTOLIC BLOOD PRESSURE: 117 MMHG | WEIGHT: 91.94 LBS | HEART RATE: 83 BPM | OXYGEN SATURATION: 100 % | DIASTOLIC BLOOD PRESSURE: 67 MMHG | HEART RATE: 82 BPM | HEIGHT: 65 IN

## 2022-01-01 DIAGNOSIS — E86.1 HYPOVOLEMIA: ICD-10-CM

## 2022-01-01 DIAGNOSIS — I10 ESSENTIAL HYPERTENSION, MALIGNANT: ICD-10-CM

## 2022-01-01 DIAGNOSIS — E87.1 HYPONATREMIA: ICD-10-CM

## 2022-01-01 DIAGNOSIS — U07.1 COVID-19 VIRUS DETECTED: ICD-10-CM

## 2022-01-01 DIAGNOSIS — K92.2 GASTROINTESTINAL HEMORRHAGE, UNSPECIFIED GASTROINTESTINAL HEMORRHAGE TYPE: Primary | ICD-10-CM

## 2022-01-01 DIAGNOSIS — M19.90 SENILE ARTHRITIS: ICD-10-CM

## 2022-01-01 DIAGNOSIS — N39.0 URINARY TRACT INFECTION WITHOUT HEMATURIA, SITE UNSPECIFIED: Primary | ICD-10-CM

## 2022-01-01 DIAGNOSIS — I11.9 MALIGNANT HYPERTENSIVE HEART DISEASE WITHOUT HEART FAILURE: ICD-10-CM

## 2022-01-01 DIAGNOSIS — I25.10 CORONARY ATHEROSCLEROSIS OF NATIVE CORONARY ARTERY: ICD-10-CM

## 2022-01-01 DIAGNOSIS — R53.81 DEBILITY: Primary | ICD-10-CM

## 2022-01-01 DIAGNOSIS — I63.9 CEREBRAL INFARCTION, UNSPECIFIED MECHANISM: ICD-10-CM

## 2022-01-01 DIAGNOSIS — R30.0 DYSURIA: Primary | ICD-10-CM

## 2022-01-01 DIAGNOSIS — R91.8 PULMONARY INFILTRATE: ICD-10-CM

## 2022-01-01 DIAGNOSIS — U07.1 CLINICAL DIAGNOSIS OF SEVERE ACUTE RESPIRATORY SYNDROME CORONAVIRUS 2 (SARS-COV-2) DISEASE: Primary | ICD-10-CM

## 2022-01-01 DIAGNOSIS — Z71.89 GOALS OF CARE, COUNSELING/DISCUSSION: Primary | ICD-10-CM

## 2022-01-01 DIAGNOSIS — U07.1 COVID-19: Primary | ICD-10-CM

## 2022-01-01 DIAGNOSIS — I95.9 HYPOTENSION: ICD-10-CM

## 2022-01-01 DIAGNOSIS — K92.2 GI BLEED: ICD-10-CM

## 2022-01-01 DIAGNOSIS — N39.0 URINARY TRACT INFECTION, SITE NOT SPECIFIED: Primary | ICD-10-CM

## 2022-01-01 DIAGNOSIS — Z71.89 GOALS OF CARE, COUNSELING/DISCUSSION: ICD-10-CM

## 2022-01-01 LAB
ABO + RH BLD: NORMAL
ABO + RH BLD: NORMAL
ALBUMIN SERPL BCP-MCNC: 2.3 G/DL (ref 3.5–5.2)
ALBUMIN SERPL BCP-MCNC: 2.4 G/DL (ref 3.5–5.2)
ALBUMIN SERPL BCP-MCNC: 2.4 G/DL (ref 3.5–5.2)
ALBUMIN SERPL BCP-MCNC: 2.5 G/DL (ref 3.5–5.2)
ALBUMIN SERPL BCP-MCNC: 2.5 G/DL (ref 3.5–5.2)
ALP SERPL-CCNC: 80 U/L (ref 55–135)
ALP SERPL-CCNC: 81 U/L (ref 55–135)
ALP SERPL-CCNC: 82 U/L (ref 55–135)
ALP SERPL-CCNC: 84 U/L (ref 55–135)
ALP SERPL-CCNC: 89 U/L (ref 55–135)
ALT SERPL W/O P-5'-P-CCNC: 26 U/L (ref 10–44)
ALT SERPL W/O P-5'-P-CCNC: 28 U/L (ref 10–44)
ALT SERPL W/O P-5'-P-CCNC: 30 U/L (ref 10–44)
ALT SERPL W/O P-5'-P-CCNC: 30 U/L (ref 10–44)
ALT SERPL W/O P-5'-P-CCNC: 42 U/L (ref 10–44)
ANION GAP SERPL CALC-SCNC: 11 MMOL/L (ref 8–16)
ANION GAP SERPL CALC-SCNC: 5 MMOL/L (ref 8–16)
ANION GAP SERPL CALC-SCNC: 8 MMOL/L (ref 8–16)
APTT BLDCRRT: 24.3 SEC (ref 21–32)
AST SERPL-CCNC: 35 U/L (ref 10–40)
AST SERPL-CCNC: 35 U/L (ref 10–40)
AST SERPL-CCNC: 43 U/L (ref 10–40)
AST SERPL-CCNC: 46 U/L (ref 10–40)
AST SERPL-CCNC: 50 U/L (ref 10–40)
BACTERIA #/AREA URNS HPF: ABNORMAL /HPF
BACTERIA UR CULT: NO GROWTH
BACTERIA UR CULT: NO GROWTH
BASOPHILS # BLD AUTO: 0.01 K/UL (ref 0–0.2)
BASOPHILS # BLD AUTO: 0.01 K/UL (ref 0–0.2)
BASOPHILS # BLD AUTO: 0.02 K/UL (ref 0–0.2)
BASOPHILS # BLD AUTO: 0.03 K/UL (ref 0–0.2)
BASOPHILS # BLD AUTO: 0.05 K/UL (ref 0–0.2)
BASOPHILS NFR BLD: 0.1 % (ref 0–1.9)
BASOPHILS NFR BLD: 0.1 % (ref 0–1.9)
BASOPHILS NFR BLD: 0.2 % (ref 0–1.9)
BASOPHILS NFR BLD: 0.3 % (ref 0–1.9)
BASOPHILS NFR BLD: 0.5 % (ref 0–1.9)
BILIRUB DIRECT SERPL-MCNC: 0.7 MG/DL (ref 0.1–0.3)
BILIRUB SERPL-MCNC: 0.4 MG/DL (ref 0.1–1)
BILIRUB SERPL-MCNC: 1.1 MG/DL (ref 0.1–1)
BILIRUB SERPL-MCNC: 1.3 MG/DL (ref 0.1–1)
BILIRUB SERPL-MCNC: 1.6 MG/DL (ref 0.1–1)
BILIRUB SERPL-MCNC: 1.7 MG/DL (ref 0.1–1)
BILIRUB UR QL STRIP: NEGATIVE
BLD GP AB SCN CELLS X3 SERPL QL: NORMAL
BLD GP AB SCN CELLS X3 SERPL QL: NORMAL
BLD PROD TYP BPU: NORMAL
BLD PROD TYP BPU: NORMAL
BLOOD UNIT EXPIRATION DATE: NORMAL
BLOOD UNIT EXPIRATION DATE: NORMAL
BLOOD UNIT TYPE CODE: 6200
BLOOD UNIT TYPE CODE: 6200
BLOOD UNIT TYPE: NORMAL
BLOOD UNIT TYPE: NORMAL
BUN SERPL-MCNC: 20 MG/DL (ref 8–23)
BUN SERPL-MCNC: 22 MG/DL (ref 8–23)
BUN SERPL-MCNC: 23 MG/DL (ref 8–23)
BUN SERPL-MCNC: 23 MG/DL (ref 8–23)
BUN SERPL-MCNC: 26 MG/DL (ref 8–23)
CALCIUM SERPL-MCNC: 8 MG/DL (ref 8.7–10.5)
CALCIUM SERPL-MCNC: 8.1 MG/DL (ref 8.7–10.5)
CALCIUM SERPL-MCNC: 8.2 MG/DL (ref 8.7–10.5)
CHLORIDE SERPL-SCNC: 101 MMOL/L (ref 95–110)
CHLORIDE SERPL-SCNC: 90 MMOL/L (ref 95–110)
CHLORIDE SERPL-SCNC: 94 MMOL/L (ref 95–110)
CHLORIDE SERPL-SCNC: 94 MMOL/L (ref 95–110)
CHLORIDE SERPL-SCNC: 96 MMOL/L (ref 95–110)
CLARITY UR: ABNORMAL
CO2 SERPL-SCNC: 14 MMOL/L (ref 23–29)
CO2 SERPL-SCNC: 15 MMOL/L (ref 23–29)
CO2 SERPL-SCNC: 16 MMOL/L (ref 23–29)
CO2 SERPL-SCNC: 19 MMOL/L (ref 23–29)
CO2 SERPL-SCNC: 25 MMOL/L (ref 23–29)
CODING SYSTEM: NORMAL
CODING SYSTEM: NORMAL
COLOR UR: YELLOW
CREAT SERPL-MCNC: 0.8 MG/DL (ref 0.5–1.4)
CREAT SERPL-MCNC: 0.9 MG/DL (ref 0.5–1.4)
CREAT SERPL-MCNC: 1 MG/DL (ref 0.5–1.4)
CTP QC/QA: YES
DIFFERENTIAL METHOD: ABNORMAL
DISPENSE STATUS: NORMAL
DISPENSE STATUS: NORMAL
EOSINOPHIL # BLD AUTO: 0 K/UL (ref 0–0.5)
EOSINOPHIL # BLD AUTO: 0.1 K/UL (ref 0–0.5)
EOSINOPHIL NFR BLD: 0 % (ref 0–8)
EOSINOPHIL NFR BLD: 0.5 % (ref 0–8)
EOSINOPHIL NFR BLD: 0.7 % (ref 0–8)
EOSINOPHIL NFR BLD: 0.9 % (ref 0–8)
EOSINOPHIL NFR BLD: 1 % (ref 0–8)
ERYTHROCYTE [DISTWIDTH] IN BLOOD BY AUTOMATED COUNT: 15.1 % (ref 11.5–14.5)
ERYTHROCYTE [DISTWIDTH] IN BLOOD BY AUTOMATED COUNT: 15.3 % (ref 11.5–14.5)
ERYTHROCYTE [DISTWIDTH] IN BLOOD BY AUTOMATED COUNT: 15.7 % (ref 11.5–14.5)
ERYTHROCYTE [DISTWIDTH] IN BLOOD BY AUTOMATED COUNT: 15.7 % (ref 11.5–14.5)
ERYTHROCYTE [DISTWIDTH] IN BLOOD BY AUTOMATED COUNT: 15.9 % (ref 11.5–14.5)
EST. GFR  (NO RACE VARIABLE): 54.9 ML/MIN/1.73 M^2
EST. GFR  (NO RACE VARIABLE): >60 ML/MIN/1.73 M^2
GLUCOSE SERPL-MCNC: 111 MG/DL (ref 70–110)
GLUCOSE SERPL-MCNC: 52 MG/DL (ref 70–110)
GLUCOSE SERPL-MCNC: 78 MG/DL (ref 70–110)
GLUCOSE SERPL-MCNC: 81 MG/DL (ref 70–110)
GLUCOSE SERPL-MCNC: 82 MG/DL (ref 70–110)
GLUCOSE UR QL STRIP: NEGATIVE
HCT VFR BLD AUTO: 19.3 % (ref 37–48.5)
HCT VFR BLD AUTO: 24.4 % (ref 37–48.5)
HCT VFR BLD AUTO: 25 % (ref 37–48.5)
HCT VFR BLD AUTO: 25.3 % (ref 37–48.5)
HCT VFR BLD AUTO: 26.2 % (ref 37–48.5)
HGB BLD-MCNC: 6.2 G/DL (ref 12–16)
HGB BLD-MCNC: 8.1 G/DL (ref 12–16)
HGB BLD-MCNC: 8.2 G/DL (ref 12–16)
HGB BLD-MCNC: 8.4 G/DL (ref 12–16)
HGB BLD-MCNC: 9 G/DL (ref 12–16)
HGB UR QL STRIP: ABNORMAL
HYALINE CASTS #/AREA URNS LPF: 0.4 /LPF
HYALINE CASTS #/AREA URNS LPF: 1 /LPF
HYALINE CASTS #/AREA URNS LPF: 2.3 /LPF
IMM GRANULOCYTES # BLD AUTO: 0.06 K/UL (ref 0–0.04)
IMM GRANULOCYTES # BLD AUTO: 0.07 K/UL (ref 0–0.04)
IMM GRANULOCYTES # BLD AUTO: 0.07 K/UL (ref 0–0.04)
IMM GRANULOCYTES # BLD AUTO: 0.08 K/UL (ref 0–0.04)
IMM GRANULOCYTES # BLD AUTO: 0.08 K/UL (ref 0–0.04)
IMM GRANULOCYTES NFR BLD AUTO: 0.6 % (ref 0–0.5)
IMM GRANULOCYTES NFR BLD AUTO: 0.7 % (ref 0–0.5)
IMM GRANULOCYTES NFR BLD AUTO: 0.7 % (ref 0–0.5)
IMM GRANULOCYTES NFR BLD AUTO: 0.8 % (ref 0–0.5)
IMM GRANULOCYTES NFR BLD AUTO: 0.8 % (ref 0–0.5)
INR PPP: 0.9 (ref 0.8–1.2)
KETONES UR QL STRIP: NEGATIVE
LEUKOCYTE ESTERASE UR QL STRIP: ABNORMAL
LYMPHOCYTES # BLD AUTO: 0.5 K/UL (ref 1–4.8)
LYMPHOCYTES # BLD AUTO: 0.7 K/UL (ref 1–4.8)
LYMPHOCYTES # BLD AUTO: 0.8 K/UL (ref 1–4.8)
LYMPHOCYTES # BLD AUTO: 0.9 K/UL (ref 1–4.8)
LYMPHOCYTES # BLD AUTO: 1.3 K/UL (ref 1–4.8)
LYMPHOCYTES NFR BLD: 11.6 % (ref 18–48)
LYMPHOCYTES NFR BLD: 5.5 % (ref 18–48)
LYMPHOCYTES NFR BLD: 7.3 % (ref 18–48)
LYMPHOCYTES NFR BLD: 7.6 % (ref 18–48)
LYMPHOCYTES NFR BLD: 8.6 % (ref 18–48)
MCH RBC QN AUTO: 28.2 PG (ref 27–31)
MCH RBC QN AUTO: 29.9 PG (ref 27–31)
MCH RBC QN AUTO: 30 PG (ref 27–31)
MCH RBC QN AUTO: 30.1 PG (ref 27–31)
MCH RBC QN AUTO: 30.6 PG (ref 27–31)
MCHC RBC AUTO-ENTMCNC: 32.1 G/DL (ref 32–36)
MCHC RBC AUTO-ENTMCNC: 32.4 G/DL (ref 32–36)
MCHC RBC AUTO-ENTMCNC: 33.2 G/DL (ref 32–36)
MCHC RBC AUTO-ENTMCNC: 33.6 G/DL (ref 32–36)
MCHC RBC AUTO-ENTMCNC: 34.4 G/DL (ref 32–36)
MCV RBC AUTO: 88 FL (ref 82–98)
MCV RBC AUTO: 89 FL (ref 82–98)
MCV RBC AUTO: 90 FL (ref 82–98)
MCV RBC AUTO: 90 FL (ref 82–98)
MCV RBC AUTO: 92 FL (ref 82–98)
MICROSCOPIC COMMENT: ABNORMAL
MONOCYTES # BLD AUTO: 0.5 K/UL (ref 0.3–1)
MONOCYTES # BLD AUTO: 1 K/UL (ref 0.3–1)
MONOCYTES # BLD AUTO: 1 K/UL (ref 0.3–1)
MONOCYTES # BLD AUTO: 1.2 K/UL (ref 0.3–1)
MONOCYTES # BLD AUTO: 1.2 K/UL (ref 0.3–1)
MONOCYTES NFR BLD: 10.5 % (ref 4–15)
MONOCYTES NFR BLD: 11 % (ref 4–15)
MONOCYTES NFR BLD: 11.5 % (ref 4–15)
MONOCYTES NFR BLD: 5.5 % (ref 4–15)
MONOCYTES NFR BLD: 9.4 % (ref 4–15)
NEUTROPHILS # BLD AUTO: 7.5 K/UL (ref 1.8–7.7)
NEUTROPHILS # BLD AUTO: 8.3 K/UL (ref 1.8–7.7)
NEUTROPHILS # BLD AUTO: 8.4 K/UL (ref 1.8–7.7)
NEUTROPHILS # BLD AUTO: 8.5 K/UL (ref 1.8–7.7)
NEUTROPHILS # BLD AUTO: 8.6 K/UL (ref 1.8–7.7)
NEUTROPHILS NFR BLD: 76 % (ref 38–73)
NEUTROPHILS NFR BLD: 78.4 % (ref 38–73)
NEUTROPHILS NFR BLD: 80.2 % (ref 38–73)
NEUTROPHILS NFR BLD: 80.7 % (ref 38–73)
NEUTROPHILS NFR BLD: 88.3 % (ref 38–73)
NITRITE UR QL STRIP: NEGATIVE
NRBC BLD-RTO: 0 /100 WBC
NUM UNITS TRANS PACKED RBC: NORMAL
NUM UNITS TRANS PACKED RBC: NORMAL
OB PNL STL: POSITIVE
PH UR STRIP: 7 [PH] (ref 5–8)
PH UR STRIP: 8 [PH] (ref 5–8)
PH UR STRIP: >8 [PH] (ref 5–8)
PLATELET # BLD AUTO: 171 K/UL (ref 150–450)
PLATELET # BLD AUTO: 172 K/UL (ref 150–450)
PLATELET # BLD AUTO: 174 K/UL (ref 150–450)
PLATELET # BLD AUTO: 175 K/UL (ref 150–450)
PLATELET # BLD AUTO: 210 K/UL (ref 150–450)
PMV BLD AUTO: 10 FL (ref 9.2–12.9)
PMV BLD AUTO: 10.3 FL (ref 9.2–12.9)
PMV BLD AUTO: 9.9 FL (ref 9.2–12.9)
POCT GLUCOSE: 84 MG/DL (ref 70–110)
POTASSIUM SERPL-SCNC: 4 MMOL/L (ref 3.5–5.1)
POTASSIUM SERPL-SCNC: 4.1 MMOL/L (ref 3.5–5.1)
POTASSIUM SERPL-SCNC: 4.7 MMOL/L (ref 3.5–5.1)
POTASSIUM SERPL-SCNC: 5 MMOL/L (ref 3.5–5.1)
POTASSIUM SERPL-SCNC: 5.4 MMOL/L (ref 3.5–5.1)
PROT SERPL-MCNC: 5.1 G/DL (ref 6–8.4)
PROT SERPL-MCNC: 5.3 G/DL (ref 6–8.4)
PROT SERPL-MCNC: 5.5 G/DL (ref 6–8.4)
PROT SERPL-MCNC: 5.7 G/DL (ref 6–8.4)
PROT SERPL-MCNC: 5.9 G/DL (ref 6–8.4)
PROT UR QL STRIP: ABNORMAL
PROTHROMBIN TIME: 10.3 SEC (ref 9–12.5)
RBC # BLD AUTO: 2.2 M/UL (ref 4–5.4)
RBC # BLD AUTO: 2.7 M/UL (ref 4–5.4)
RBC # BLD AUTO: 2.74 M/UL (ref 4–5.4)
RBC # BLD AUTO: 2.79 M/UL (ref 4–5.4)
RBC # BLD AUTO: 2.94 M/UL (ref 4–5.4)
RBC #/AREA URNS HPF: 3 /HPF (ref 0–4)
RBC #/AREA URNS HPF: 4 /HPF (ref 0–4)
RBC #/AREA URNS HPF: 8 /HPF (ref 0–4)
SARS-COV-2 RDRP RESP QL NAA+PROBE: NEGATIVE
SARS-COV-2 RNA RESP QL NAA+PROBE: DETECTED
SODIUM SERPL-SCNC: 120 MMOL/L (ref 136–145)
SODIUM SERPL-SCNC: 120 MMOL/L (ref 136–145)
SODIUM SERPL-SCNC: 121 MMOL/L (ref 136–145)
SODIUM SERPL-SCNC: 123 MMOL/L (ref 136–145)
SODIUM SERPL-SCNC: 126 MMOL/L (ref 136–145)
SP GR UR STRIP: 1.01 (ref 1–1.03)
SQUAMOUS #/AREA URNS HPF: 1 /HPF
URN SPEC COLLECT METH UR: ABNORMAL
UROBILINOGEN UR STRIP-ACNC: 1 EU/DL
WBC # BLD AUTO: 10.31 K/UL (ref 3.9–12.7)
WBC # BLD AUTO: 10.74 K/UL (ref 3.9–12.7)
WBC # BLD AUTO: 11.2 K/UL (ref 3.9–12.7)
WBC # BLD AUTO: 9.33 K/UL (ref 3.9–12.7)
WBC # BLD AUTO: 9.76 K/UL (ref 3.9–12.7)
WBC #/AREA URNS HPF: >100 /HPF (ref 0–5)
YEAST URNS QL MICRO: ABNORMAL
YEAST URNS QL MICRO: ABNORMAL

## 2022-01-01 PROCEDURE — 85610 PROTHROMBIN TIME: CPT | Performed by: STUDENT IN AN ORGANIZED HEALTH CARE EDUCATION/TRAINING PROGRAM

## 2022-01-01 PROCEDURE — 63600175 PHARM REV CODE 636 W HCPCS: Performed by: NURSE ANESTHETIST, CERTIFIED REGISTERED

## 2022-01-01 PROCEDURE — 63600175 PHARM REV CODE 636 W HCPCS: Performed by: HOSPITALIST

## 2022-01-01 PROCEDURE — 25000003 PHARM REV CODE 250: Performed by: NURSE PRACTITIONER

## 2022-01-01 PROCEDURE — 63600175 PHARM REV CODE 636 W HCPCS: Performed by: STUDENT IN AN ORGANIZED HEALTH CARE EDUCATION/TRAINING PROGRAM

## 2022-01-01 PROCEDURE — 63600175 PHARM REV CODE 636 W HCPCS: Performed by: NURSE PRACTITIONER

## 2022-01-01 PROCEDURE — 25000003 PHARM REV CODE 250: Performed by: STUDENT IN AN ORGANIZED HEALTH CARE EDUCATION/TRAINING PROGRAM

## 2022-01-01 PROCEDURE — 99497 ADVNCD CARE PLAN 30 MIN: CPT | Mod: 25,,,

## 2022-01-01 PROCEDURE — 87086 URINE CULTURE/COLONY COUNT: CPT | Performed by: INTERNAL MEDICINE

## 2022-01-01 PROCEDURE — 80053 COMPREHEN METABOLIC PANEL: CPT | Performed by: NURSE PRACTITIONER

## 2022-01-01 PROCEDURE — 86901 BLOOD TYPING SEROLOGIC RH(D): CPT | Performed by: STUDENT IN AN ORGANIZED HEALTH CARE EDUCATION/TRAINING PROGRAM

## 2022-01-01 PROCEDURE — 86850 RBC ANTIBODY SCREEN: CPT | Performed by: NURSE PRACTITIONER

## 2022-01-01 PROCEDURE — 96374 THER/PROPH/DIAG INJ IV PUSH: CPT

## 2022-01-01 PROCEDURE — U0002 COVID-19 LAB TEST NON-CDC: HCPCS | Performed by: INTERNAL MEDICINE

## 2022-01-01 PROCEDURE — 99223 1ST HOSP IP/OBS HIGH 75: CPT | Mod: 25,,, | Performed by: INTERNAL MEDICINE

## 2022-01-01 PROCEDURE — U0002 COVID-19 LAB TEST NON-CDC: HCPCS | Performed by: STUDENT IN AN ORGANIZED HEALTH CARE EDUCATION/TRAINING PROGRAM

## 2022-01-01 PROCEDURE — 25000003 PHARM REV CODE 250: Performed by: EMERGENCY MEDICINE

## 2022-01-01 PROCEDURE — 36415 COLL VENOUS BLD VENIPUNCTURE: CPT | Performed by: NURSE PRACTITIONER

## 2022-01-01 PROCEDURE — 99223 PR INITIAL HOSPITAL CARE,LEVL III: ICD-10-PCS | Mod: ,,,

## 2022-01-01 PROCEDURE — 82248 BILIRUBIN DIRECT: CPT | Performed by: HOSPITALIST

## 2022-01-01 PROCEDURE — 85025 COMPLETE CBC W/AUTO DIFF WBC: CPT | Performed by: STUDENT IN AN ORGANIZED HEALTH CARE EDUCATION/TRAINING PROGRAM

## 2022-01-01 PROCEDURE — 80053 COMPREHEN METABOLIC PANEL: CPT | Performed by: STUDENT IN AN ORGANIZED HEALTH CARE EDUCATION/TRAINING PROGRAM

## 2022-01-01 PROCEDURE — 93010 EKG 12-LEAD: ICD-10-PCS | Mod: ,,, | Performed by: INTERNAL MEDICINE

## 2022-01-01 PROCEDURE — 99223 1ST HOSP IP/OBS HIGH 75: CPT | Mod: ,,,

## 2022-01-01 PROCEDURE — C9113 INJ PANTOPRAZOLE SODIUM, VIA: HCPCS | Performed by: NURSE PRACTITIONER

## 2022-01-01 PROCEDURE — 25000003 PHARM REV CODE 250: Performed by: NURSE ANESTHETIST, CERTIFIED REGISTERED

## 2022-01-01 PROCEDURE — 25000003 PHARM REV CODE 250: Performed by: HOSPITALIST

## 2022-01-01 PROCEDURE — 81000 URINALYSIS NONAUTO W/SCOPE: CPT | Performed by: INTERNAL MEDICINE

## 2022-01-01 PROCEDURE — 86920 COMPATIBILITY TEST SPIN: CPT | Performed by: STUDENT IN AN ORGANIZED HEALTH CARE EDUCATION/TRAINING PROGRAM

## 2022-01-01 PROCEDURE — 37000009 HC ANESTHESIA EA ADD 15 MINS: Performed by: INTERNAL MEDICINE

## 2022-01-01 PROCEDURE — 27202087 HC PROBE, APC: Performed by: INTERNAL MEDICINE

## 2022-01-01 PROCEDURE — 85025 COMPLETE CBC W/AUTO DIFF WBC: CPT | Mod: 91 | Performed by: NURSE PRACTITIONER

## 2022-01-01 PROCEDURE — 99284 EMERGENCY DEPT VISIT MOD MDM: CPT | Mod: 25

## 2022-01-01 PROCEDURE — 96360 HYDRATION IV INFUSION INIT: CPT

## 2022-01-01 PROCEDURE — 43270 EGD LESION ABLATION: CPT | Mod: ,,, | Performed by: INTERNAL MEDICINE

## 2022-01-01 PROCEDURE — 36415 COLL VENOUS BLD VENIPUNCTURE: CPT | Performed by: STUDENT IN AN ORGANIZED HEALTH CARE EDUCATION/TRAINING PROGRAM

## 2022-01-01 PROCEDURE — C9113 INJ PANTOPRAZOLE SODIUM, VIA: HCPCS | Performed by: STUDENT IN AN ORGANIZED HEALTH CARE EDUCATION/TRAINING PROGRAM

## 2022-01-01 PROCEDURE — 99442 PR PHYSICIAN TELEPHONE EVALUATION 11-20 MIN: ICD-10-PCS | Mod: 95,GW,, | Performed by: STUDENT IN AN ORGANIZED HEALTH CARE EDUCATION/TRAINING PROGRAM

## 2022-01-01 PROCEDURE — 93005 ELECTROCARDIOGRAM TRACING: CPT

## 2022-01-01 PROCEDURE — 99223 PR INITIAL HOSPITAL CARE,LEVL III: ICD-10-PCS | Mod: 25,,, | Performed by: INTERNAL MEDICINE

## 2022-01-01 PROCEDURE — 99442 PR PHYSICIAN TELEPHONE EVALUATION 11-20 MIN: CPT | Mod: 95,GW,, | Performed by: STUDENT IN AN ORGANIZED HEALTH CARE EDUCATION/TRAINING PROGRAM

## 2022-01-01 PROCEDURE — 43255 EGD CONTROL BLEEDING ANY: CPT | Performed by: INTERNAL MEDICINE

## 2022-01-01 PROCEDURE — 82272 OCCULT BLD FECES 1-3 TESTS: CPT | Performed by: STUDENT IN AN ORGANIZED HEALTH CARE EDUCATION/TRAINING PROGRAM

## 2022-01-01 PROCEDURE — 93010 ELECTROCARDIOGRAM REPORT: CPT | Mod: ,,, | Performed by: INTERNAL MEDICINE

## 2022-01-01 PROCEDURE — 80053 COMPREHEN METABOLIC PANEL: CPT | Mod: 91 | Performed by: HOSPITALIST

## 2022-01-01 PROCEDURE — 43270 PR EGD, FLEX, W/ABLATION, TUMOR/POLYP/LESION(S), W/ DILATION: ICD-10-PCS | Mod: ,,, | Performed by: INTERNAL MEDICINE

## 2022-01-01 PROCEDURE — P9016 RBC LEUKOCYTES REDUCED: HCPCS | Performed by: STUDENT IN AN ORGANIZED HEALTH CARE EDUCATION/TRAINING PROGRAM

## 2022-01-01 PROCEDURE — 85730 THROMBOPLASTIN TIME PARTIAL: CPT | Performed by: STUDENT IN AN ORGANIZED HEALTH CARE EDUCATION/TRAINING PROGRAM

## 2022-01-01 PROCEDURE — 11000001 HC ACUTE MED/SURG PRIVATE ROOM

## 2022-01-01 PROCEDURE — 36430 TRANSFUSION BLD/BLD COMPNT: CPT

## 2022-01-01 PROCEDURE — 80053 COMPREHEN METABOLIC PANEL: CPT | Mod: 91 | Performed by: NURSE PRACTITIONER

## 2022-01-01 PROCEDURE — 99285 EMERGENCY DEPT VISIT HI MDM: CPT | Mod: 25

## 2022-01-01 PROCEDURE — 36415 COLL VENOUS BLD VENIPUNCTURE: CPT | Performed by: HOSPITALIST

## 2022-01-01 PROCEDURE — 37000008 HC ANESTHESIA 1ST 15 MINUTES: Performed by: INTERNAL MEDICINE

## 2022-01-01 PROCEDURE — 99497 PR ADVNCD CARE PLAN 30 MIN: ICD-10-PCS | Mod: 25,,,

## 2022-01-01 PROCEDURE — 85025 COMPLETE CBC W/AUTO DIFF WBC: CPT | Performed by: NURSE PRACTITIONER

## 2022-01-01 RX ORDER — ACETAMINOPHEN 500 MG
1 TABLET ORAL
COMMUNITY

## 2022-01-01 RX ORDER — FAMOTIDINE 20 MG/1
1 TABLET, FILM COATED ORAL
Status: ON HOLD | COMMUNITY
End: 2022-01-01

## 2022-01-01 RX ORDER — TALC
6 POWDER (GRAM) TOPICAL NIGHTLY PRN
Status: DISCONTINUED | OUTPATIENT
Start: 2022-01-01 | End: 2022-01-01 | Stop reason: HOSPADM

## 2022-01-01 RX ORDER — ACETAMINOPHEN 325 MG/1
2 TABLET ORAL
COMMUNITY

## 2022-01-01 RX ORDER — RISPERIDONE 3 MG/1
1 TABLET ORAL
Status: ON HOLD | COMMUNITY
End: 2022-01-01

## 2022-01-01 RX ORDER — CIPROFLOXACIN 250 MG/1
1 TABLET, FILM COATED ORAL
Status: ON HOLD | COMMUNITY
Start: 2021-07-15 | End: 2022-01-01

## 2022-01-01 RX ORDER — ONDANSETRON 2 MG/ML
4 INJECTION INTRAMUSCULAR; INTRAVENOUS EVERY 6 HOURS PRN
Status: DISCONTINUED | OUTPATIENT
Start: 2022-01-01 | End: 2022-01-01 | Stop reason: HOSPADM

## 2022-01-01 RX ORDER — DEXMEDETOMIDINE HYDROCHLORIDE 100 UG/ML
INJECTION, SOLUTION INTRAVENOUS
Status: DISCONTINUED | OUTPATIENT
Start: 2022-01-01 | End: 2022-01-01

## 2022-01-01 RX ORDER — METAXALONE 800 MG/1
1 TABLET ORAL
Status: ON HOLD | COMMUNITY
End: 2022-01-01

## 2022-01-01 RX ORDER — ACETAMINOPHEN 325 MG/1
650 TABLET ORAL EVERY 8 HOURS PRN
Status: DISCONTINUED | OUTPATIENT
Start: 2022-01-01 | End: 2022-01-01 | Stop reason: HOSPADM

## 2022-01-01 RX ORDER — VITAMIN E 268 MG
1 CAPSULE ORAL
COMMUNITY

## 2022-01-01 RX ORDER — LIDOCAINE HCL/PF 100 MG/5ML
SYRINGE (ML) INTRAVENOUS
Status: DISCONTINUED | OUTPATIENT
Start: 2022-01-01 | End: 2022-01-01

## 2022-01-01 RX ORDER — PANTOPRAZOLE SODIUM 40 MG/10ML
40 INJECTION, POWDER, LYOPHILIZED, FOR SOLUTION INTRAVENOUS
Status: COMPLETED | OUTPATIENT
Start: 2022-01-01 | End: 2022-01-01

## 2022-01-01 RX ORDER — PANTOPRAZOLE SODIUM 40 MG/10ML
40 INJECTION, POWDER, LYOPHILIZED, FOR SOLUTION INTRAVENOUS
Status: DISCONTINUED | OUTPATIENT
Start: 2022-01-01 | End: 2022-01-01

## 2022-01-01 RX ORDER — MIDODRINE HYDROCHLORIDE 5 MG/1
2 TABLET ORAL
Status: ON HOLD | COMMUNITY
End: 2022-01-01 | Stop reason: HOSPADM

## 2022-01-01 RX ORDER — SODIUM BICARBONATE 650 MG/1
TABLET ORAL
Status: ON HOLD | COMMUNITY
End: 2022-01-01

## 2022-01-01 RX ORDER — POTASSIUM CHLORIDE 20 MEQ/15ML
15 SOLUTION ORAL
Status: ON HOLD | COMMUNITY
End: 2022-01-01

## 2022-01-01 RX ORDER — SODIUM CHLORIDE 9 MG/ML
1000 INJECTION, SOLUTION INTRAVENOUS
Status: COMPLETED | OUTPATIENT
Start: 2022-01-01 | End: 2022-01-01

## 2022-01-01 RX ORDER — ACETAMINOPHEN 325 MG/1
650 TABLET ORAL EVERY 6 HOURS PRN
Status: DISCONTINUED | OUTPATIENT
Start: 2022-01-01 | End: 2022-01-01 | Stop reason: HOSPADM

## 2022-01-01 RX ORDER — PHENYLEPHRINE HYDROCHLORIDE 10 MG/ML
INJECTION INTRAVENOUS
Status: DISCONTINUED | OUTPATIENT
Start: 2022-01-01 | End: 2022-01-01

## 2022-01-01 RX ORDER — ALPRAZOLAM 0.25 MG/1
1 TABLET ORAL
Status: ON HOLD | COMMUNITY
End: 2022-01-01 | Stop reason: HOSPADM

## 2022-01-01 RX ORDER — VENLAFAXINE 37.5 MG/1
37.5 TABLET ORAL NIGHTLY
Status: DISCONTINUED | OUTPATIENT
Start: 2022-01-01 | End: 2022-01-01 | Stop reason: HOSPADM

## 2022-01-01 RX ORDER — GUAIFENESIN 100 MG/5ML
200 SOLUTION ORAL EVERY 4 HOURS PRN
Qty: 60 ML | Refills: 0 | Status: SHIPPED | OUTPATIENT
Start: 2022-01-01 | End: 2022-01-01

## 2022-01-01 RX ORDER — ATORVASTATIN CALCIUM 40 MG/1
40 TABLET, FILM COATED ORAL NIGHTLY
Status: DISCONTINUED | OUTPATIENT
Start: 2022-01-01 | End: 2022-01-01 | Stop reason: HOSPADM

## 2022-01-01 RX ORDER — LANOLIN ALCOHOL/MO/W.PET/CERES
400 CREAM (GRAM) TOPICAL DAILY
Status: DISCONTINUED | OUTPATIENT
Start: 2022-01-01 | End: 2022-01-01 | Stop reason: HOSPADM

## 2022-01-01 RX ORDER — ETOMIDATE 2 MG/ML
INJECTION INTRAVENOUS
Status: DISCONTINUED | OUTPATIENT
Start: 2022-01-01 | End: 2022-01-01

## 2022-01-01 RX ORDER — UBIDECARENONE 75 MG
1 CAPSULE ORAL
COMMUNITY

## 2022-01-01 RX ORDER — SODIUM CHLORIDE 9 MG/ML
INJECTION, SOLUTION INTRAVENOUS CONTINUOUS
Status: DISCONTINUED | OUTPATIENT
Start: 2022-01-01 | End: 2022-01-01 | Stop reason: HOSPADM

## 2022-01-01 RX ORDER — PROPOFOL 10 MG/ML
VIAL (ML) INTRAVENOUS
Status: DISCONTINUED | OUTPATIENT
Start: 2022-01-01 | End: 2022-01-01

## 2022-01-01 RX ORDER — NAPROXEN SODIUM 220 MG/1
81 TABLET, FILM COATED ORAL DAILY
Qty: 30 TABLET | Refills: 11 | Status: SHIPPED | OUTPATIENT
Start: 2022-01-01 | End: 2023-09-30

## 2022-01-01 RX ORDER — QUINIDINE GLUCONATE 324 MG
1 TABLET, EXTENDED RELEASE ORAL
Status: ON HOLD | COMMUNITY
End: 2022-01-01

## 2022-01-01 RX ORDER — CYCLOBENZAPRINE HCL 5 MG
1 TABLET ORAL
Status: ON HOLD | COMMUNITY
End: 2022-01-01

## 2022-01-01 RX ORDER — LOSARTAN POTASSIUM 50 MG/1
1 TABLET ORAL
Status: ON HOLD | COMMUNITY
End: 2022-01-01

## 2022-01-01 RX ORDER — SUCRALFATE 1 G/10ML
10 SUSPENSION ORAL
Status: ON HOLD | COMMUNITY
End: 2022-01-01

## 2022-01-01 RX ORDER — OLANZAPINE 5 MG/1
15 TABLET, ORALLY DISINTEGRATING ORAL NIGHTLY
Status: DISCONTINUED | OUTPATIENT
Start: 2022-01-01 | End: 2022-01-01 | Stop reason: HOSPADM

## 2022-01-01 RX ORDER — HYDROCODONE BITARTRATE AND ACETAMINOPHEN 500; 5 MG/1; MG/1
TABLET ORAL
Status: DISCONTINUED | OUTPATIENT
Start: 2022-01-01 | End: 2022-01-01 | Stop reason: HOSPADM

## 2022-01-01 RX ORDER — SODIUM CHLORIDE 9 MG/ML
INJECTION, SOLUTION INTRAVENOUS CONTINUOUS
Status: ACTIVE | OUTPATIENT
Start: 2022-01-01 | End: 2022-01-01

## 2022-01-01 RX ORDER — PREDNISONE 20 MG/1
20 TABLET ORAL DAILY
Status: DISCONTINUED | OUTPATIENT
Start: 2022-01-01 | End: 2022-01-01 | Stop reason: HOSPADM

## 2022-01-01 RX ORDER — CEFUROXIME AXETIL 250 MG/1
1 TABLET ORAL
Status: ON HOLD | COMMUNITY
Start: 2021-04-15 | End: 2022-01-01

## 2022-01-01 RX ORDER — ESCITALOPRAM OXALATE 20 MG/1
1 TABLET ORAL
Status: ON HOLD | COMMUNITY
End: 2022-01-01

## 2022-01-01 RX ORDER — PLANT STANOL ESTER 450 MG
1 TABLET ORAL
Status: ON HOLD | COMMUNITY
End: 2022-01-01

## 2022-01-01 RX ORDER — PANTOPRAZOLE SODIUM 40 MG/1
40 FOR SUSPENSION ORAL DAILY
Qty: 30 PACKET | Refills: 11 | Status: SHIPPED | OUTPATIENT
Start: 2022-01-01 | End: 2023-09-30

## 2022-01-01 RX ORDER — DIAZEPAM 2 MG/1
1 TABLET ORAL
Status: ON HOLD | COMMUNITY
End: 2022-01-01

## 2022-01-01 RX ADMIN — ACETAMINOPHEN 650 MG: 325 TABLET ORAL at 09:09

## 2022-01-01 RX ADMIN — SODIUM CHLORIDE 8 MG/HR: 9 INJECTION, SOLUTION INTRAVENOUS at 05:09

## 2022-01-01 RX ADMIN — SODIUM CHLORIDE 8 MG/HR: 9 INJECTION, SOLUTION INTRAVENOUS at 02:09

## 2022-01-01 RX ADMIN — PANTOPRAZOLE SODIUM 40 MG: 40 INJECTION, POWDER, FOR SOLUTION INTRAVENOUS at 09:09

## 2022-01-01 RX ADMIN — DEXMEDETOMIDINE HCL 8 MCG: 100 INJECTION INTRAVENOUS at 04:09

## 2022-01-01 RX ADMIN — SODIUM CHLORIDE 8 MG/HR: 9 INJECTION, SOLUTION INTRAVENOUS at 09:09

## 2022-01-01 RX ADMIN — VENLAFAXINE 37.5 MG: 37.5 TABLET ORAL at 09:09

## 2022-01-01 RX ADMIN — SODIUM CHLORIDE 1000 ML: 0.9 INJECTION, SOLUTION INTRAVENOUS at 12:09

## 2022-01-01 RX ADMIN — ETOMIDATE 4 MG: 2 INJECTION, SOLUTION INTRAVENOUS at 04:09

## 2022-01-01 RX ADMIN — PREDNISONE 20 MG: 20 TABLET ORAL at 12:09

## 2022-01-01 RX ADMIN — SODIUM CHLORIDE: 0.9 INJECTION, SOLUTION INTRAVENOUS at 05:09

## 2022-01-01 RX ADMIN — Medication 400 MG: at 08:09

## 2022-01-01 RX ADMIN — LIDOCAINE HYDROCHLORIDE 40 MG: 20 INJECTION, SOLUTION INTRAVENOUS at 04:09

## 2022-01-01 RX ADMIN — GLYCOPYRROLATE 0.2 MG: 0.2 INJECTION, SOLUTION INTRAMUSCULAR; INTRAVITREAL at 04:09

## 2022-01-01 RX ADMIN — OLANZAPINE 15 MG: 5 TABLET, ORALLY DISINTEGRATING ORAL at 09:09

## 2022-01-01 RX ADMIN — SODIUM CHLORIDE 8 MG/HR: 9 INJECTION, SOLUTION INTRAVENOUS at 12:09

## 2022-01-01 RX ADMIN — PREDNISONE 20 MG: 20 TABLET ORAL at 08:09

## 2022-01-01 RX ADMIN — SODIUM CHLORIDE: 0.9 INJECTION, SOLUTION INTRAVENOUS at 04:09

## 2022-01-01 RX ADMIN — PROPOFOL 10 MG: 10 INJECTION, EMULSION INTRAVENOUS at 04:09

## 2022-01-01 RX ADMIN — Medication 400 MG: at 12:09

## 2022-01-01 RX ADMIN — PHENYLEPHRINE HYDROCHLORIDE 100 MCG: 10 INJECTION INTRAVENOUS at 04:09

## 2022-01-01 RX ADMIN — ATORVASTATIN CALCIUM 40 MG: 40 TABLET, FILM COATED ORAL at 09:09

## 2022-02-03 ENCOUNTER — APPOINTMENT (OUTPATIENT)
Dept: LAB | Facility: HOSPITAL | Age: 87
End: 2022-02-03
Attending: INTERNAL MEDICINE
Payer: MEDICARE

## 2022-02-03 DIAGNOSIS — L89.613: ICD-10-CM

## 2022-02-03 DIAGNOSIS — I35.0 NODULAR CALCIFIC AORTIC VALVE STENOSIS: ICD-10-CM

## 2022-02-03 DIAGNOSIS — M24.561 BILATERAL KNEE CONTRACTURES: ICD-10-CM

## 2022-02-03 DIAGNOSIS — M35.3 ANARTHRITIC RHEUMATOID DISEASE: ICD-10-CM

## 2022-02-03 DIAGNOSIS — M24.562 BILATERAL KNEE CONTRACTURES: ICD-10-CM

## 2022-02-03 DIAGNOSIS — L89.623: ICD-10-CM

## 2022-02-03 LAB — SARS-COV-2 RNA RESP QL NAA+PROBE: NOT DETECTED

## 2022-02-03 PROCEDURE — U0002 COVID-19 LAB TEST NON-CDC: HCPCS | Performed by: INTERNAL MEDICINE

## 2022-09-02 NOTE — ED PROVIDER NOTES
EMERGENCY DEPARTMENT HISTORY AND PHYSICAL EXAM          Date: 9/2/2022   Patient Name: Kristen Draper       History of Presenting Illness           Chief Complaint   Patient presents with    Hypotension     Home health reports for the last three days whenever they move her from bed to a chair her   BP drops.         History Provided By: Children    1340   Kristen Draper is a 87 y.o. female with PMHX of aortic stenosis, CVA, nonverbal, g-tube dependent who presents to the emergency department C/O hypotension.    Patient comes to ED as home health was concerned patient's blood pressure was too low.  Daughter states that patient has been having hypertension the past 3 days.  Blood pressure drops when patient is moved from laying to sitting position.  She tested positive for COVID on 08/25.  Patient's daughter states that she is otherwise at her baseline however has had increase of congestion in her throat that that cannot clear.      PCP: Kecia Marte MD        No current facility-administered medications for this encounter.     Current Outpatient Medications   Medication Sig Dispense Refill    atorvastatin (LIPITOR) 40 MG tablet Take 40 mg by mouth once daily.      dipyridamole-aspirin 200-25 mg (AGGRENOX)  mg CM12 Take 1 capsule by mouth 2 (two) times daily. 60 capsule 5    doxycycline (VIBRA-TABS) 100 MG tablet Take 1 tablet (100 mg total) by mouth 2 (two) times daily. for 10 days 20 tablet 0    fluconazole (DIFLUCAN) 100 MG tablet Take 1 tablet by mouth once daily for 7 days 7 tablet 0    guaiFENesin 100 mg/5 ml (ROBITUSSIN) 100 mg/5 mL syrup 10 mLs (200 mg total) by Per G Tube route every 4 (four) hours as needed for Congestion or Cough. 60 mL 0    magnesium oxide (MAG-OX) 400 mg (241.3 mg magnesium) tablet Take 1 tablet (400 mg total) by mouth once daily. 90 tablet 3    metoprolol succinate (TOPROL-XL) 25 MG 24 hr tablet Take 1 tablet (25 mg total) by mouth once daily. 30 tablet 11     OLANZapine (ZYPREXA) 7.5 MG tablet Take 2 tablets (15 mg total) by mouth every evening. 90 tablet 1    pantoprazole (PROTONIX) 40 MG tablet Take 40 mg by mouth 2 (two) times daily.      predniSONE (DELTASONE) 10 MG tablet Take 2 tablets (20 mg total) by mouth once daily. 90 tablet 1    venlafaxine (EFFEXOR) 37.5 MG Tab Take by mouth 2 (two) times daily.             Past History     Past Medical History:   Past Medical History:   Diagnosis Date    Anemia     Anticoagulant long-term use     Anxiety     Aortic stenosis     Arthritis     Bilateral carotid artery disease     Depression     Encounter for blood transfusion     Hemorrhoids     TIA (transient ischemic attack)         Past Surgical History:   Past Surgical History:   Procedure Laterality Date    HYSTERECTOMY          Family History:   Family History   Problem Relation Age of Onset    Cancer Mother         Social History:   Social History     Tobacco Use    Smoking status: Former    Smokeless tobacco: Never   Substance Use Topics    Alcohol use: No    Drug use: No        Allergies:   Review of patient's allergies indicates:   Allergen Reactions    Aspirin Nausea Only    Penicillins           Review of Systems   Review of Systems   Unable to perform ROS: Patient nonverbal              Physical Exam     Vitals:    09/02/22 1223   BP: (!) 152/65   Pulse: 79   Resp: 18   Temp: 98 °F (36.7 °C)   SpO2: 98%      Physical Exam  Vitals and nursing note reviewed.   Constitutional:       General: She is not in acute distress.     Appearance: Normal appearance. She is underweight.      Comments: Frail female lying in bed, contracted extremities, not cooperative with exam   HENT:      Head: Normocephalic and atraumatic.      Right Ear: External ear normal.      Left Ear: External ear normal.      Nose: Congestion present. No rhinorrhea.      Mouth/Throat:      Mouth: Mucous membranes are dry.   Eyes:      Conjunctiva/sclera: Conjunctivae normal.      Pupils: Pupils are  equal, round, and reactive to light.   Cardiovascular:      Rate and Rhythm: Normal rate and regular rhythm.      Heart sounds: Normal heart sounds.   Pulmonary:      Effort: Pulmonary effort is normal. No respiratory distress.   Abdominal:      General: There is no distension.      Palpations: Abdomen is soft.      Tenderness: There is no abdominal tenderness.   Musculoskeletal:         General: No deformity.      Cervical back: Normal range of motion. No rigidity.      Right lower leg: No edema.      Left lower leg: No edema.      Comments: Contracted upper and lower extremities   Skin:     General: Skin is dry.   Neurological:      Mental Status: She is alert. Mental status is at baseline.            Diagnostic Study Results      Labs -   No results found for this or any previous visit (from the past 12 hour(s)).     Radiologic Studies -    X-Ray Chest 1 View   Final Result      But limited rotated image with left basilar infiltrate or atelectasis and left pleural effusion         Electronically signed by: Salina Orantes MD   Date:    09/02/2022   Time:    13:16           Medications given in the ED-   Medications   sodium chloride 0.9% bolus 1,000 mL (1,000 mLs Intravenous New Bag 9/2/22 1250)           Medical Decision Making    I am the first provider for this patient.     I reviewed the vital signs, available nursing notes, past medical history, past surgical history, family history and social history.     Vital Signs:  Reviewed the patient's vital signs.     Pulse Oximetry Analysis and Interpretation:    98% on Room Air, normal    Cardiac Monitor:   Interpreted by Dr. Afshin Jerez     Rate: 68 bpm   Rhythm: NSR       CXR  interpretation: (Preliminary)   CXR read by Dr. Afshin Jerez     Technically poor study, left small pleural effusion and infiltrate     Records Reviewed: Old medical records.  Nursing notes.        Provider Notes (Medical Decision Making): Kristen Draper is a 87 y.o. female here  for transient hypertension.  Patient given IV fluids, 500 cc pre-hospital and normotensive in ED.  Given additional IV fluids in ED and patient now hypertensive.  Patient with labile BP to past few days with body positioning.  Suspect this is due to autonomic dysfunction as well as hypovolemia.  Patient is afebrile and in normal sinus rhythm.  I am not suspicious of sepsis at this time.  Additionally tested positive for COVID 7 days ago.  Not a candidate for paxlovid at this current time due to duration of symptoms.  I was going to start patient on antibiotic for possible developing infiltrate on radiograph however patient is currently on doxycycline and this should be suitable.  Daughters asked if there was anything for congestion.  Patient has difficulty clearing secretions and congestion due to CVA.  Will prescribed guaifenesin.  Advised are to call Dr. Marte office for follow-up and reasons to return to ED discussed at length.        Procedures:   Procedures      ED Course:               Diagnosis and Disposition     Critical Care:      DISCHARGE NOTE:       Kristen Draper's  results have been reviewed with her.  She has been counseled regarding her diagnosis, treatment, and plan.  She verbally conveys understanding and agreement of the signs, symptoms, diagnosis, treatment and prognosis and additionally agrees to follow up as discussed.  She also agrees with the care-plan and conveys that all of her questions have been answered.  I have also provided discharge instructions for her that include: educational information regarding their diagnosis and treatment, and list of reasons why they would want to return to the ED prior to their follow-up appointment, should her condition change. She has been provided with education for proper emergency department utilization.         CLINICAL IMPRESSION:           1. COVID-19    2. Hypotension    3. Hypovolemia    4. Pulmonary infiltrate              PLAN:   1.  Discharge Home  2.      Medication List        START taking these medications      guaiFENesin 100 mg/5 ml 100 mg/5 mL syrup  Commonly known as: ROBITUSSIN  10 mLs (200 mg total) by Per G Tube route every 4 (four) hours as needed for Congestion or Cough.            ASK your doctor about these medications      atorvastatin 40 MG tablet  Commonly known as: LIPITOR     dipyridamole-aspirin 200-25 mg  mg Cm12  Commonly known as: AGGRENOX  Take 1 capsule by mouth 2 (two) times daily.     doxycycline 100 MG tablet  Commonly known as: VIBRA-TABS  Take 1 tablet (100 mg total) by mouth 2 (two) times daily. for 10 days     fluconazole 100 MG tablet  Commonly known as: DIFLUCAN  Take 1 tablet by mouth once daily for 7 days     magnesium oxide 400 mg (241.3 mg magnesium) tablet  Commonly known as: MAG-OX  Take 1 tablet (400 mg total) by mouth once daily.     metoprolol succinate 25 MG 24 hr tablet  Commonly known as: TOPROL-XL  Take 1 tablet (25 mg total) by mouth once daily.     OLANZapine 7.5 MG tablet  Commonly known as: ZyPREXA  Take 2 tablets (15 mg total) by mouth every evening.     pantoprazole 40 MG tablet  Commonly known as: PROTONIX     predniSONE 10 MG tablet  Commonly known as: DELTASONE  Take 2 tablets (20 mg total) by mouth once daily.     venlafaxine 37.5 MG Tab  Commonly known as: EFFEXOR               Where to Get Your Medications        You can get these medications from any pharmacy    Bring a paper prescription for each of these medications  guaiFENesin 100 mg/5 ml 100 mg/5 mL syrup        3. Kecia Marte MD  [address] [place] [id number]  [phone]    Call   Primary care follow up    [facility] Emergency Department  [address] [place] [id number] [phone]  Go to   If symptoms worsen     _______________________________     Please note that this dictation was completed with [employer], the computer voice recognition software.  Quite often unanticipated  grammatical, syntax, homophones, and other interpretive errors are inadvertently transcribed by the computer software.  Please disregard these errors.  Please excuse any errors that have escaped final proofreading.             Afshin Jerez MD  09/02/22 5082

## 2022-09-02 NOTE — ED NOTES
When attempting to sit patient up for wheelchair to go in car to transport home, staff determined patient severely contracted and trunk instability. Family agreed to have [facility] called for transport home.

## 2022-09-28 PROBLEM — F41.1 GENERALIZED ANXIETY DISORDER: Status: ACTIVE | Noted: 2022-01-01

## 2022-09-28 PROBLEM — D64.9 ANEMIA: Status: ACTIVE | Noted: 2022-01-01

## 2022-09-28 PROBLEM — E87.1 HYPONATREMIA: Status: ACTIVE | Noted: 2022-01-01

## 2022-09-28 PROBLEM — I10 BENIGN ESSENTIAL HYPERTENSION: Status: ACTIVE | Noted: 2022-01-01

## 2022-09-28 PROBLEM — F33.9 DEPRESSION, MAJOR, RECURRENT: Status: ACTIVE | Noted: 2022-01-01

## 2022-09-28 PROBLEM — I63.9 CEREBRAL INFARCTION: Status: ACTIVE | Noted: 2022-01-01

## 2022-09-28 NOTE — ED PROVIDER NOTES
History  Chief Complaint   Patient presents with    Weakness     Pt to ED via EMS - stated that pt had blood work done earlier today with H&H 5.9/18 - contacted by Dr. Marte and referred to ED.      87-year-old female with history of anemia, anxiety, aortic stenosis, depression and dementia who presents due to abnormal labs.  Patient sent in by her PCP due to low hemoglobin.  Patient's son is not endorsing any blood per rectum, hemoptysis, hematemesis, or hematuria.  No prior history of GI bleed in the past.  Other than weakness no specific symptoms were endorsed.       Past Medical History:   Diagnosis Date    Anemia     Anticoagulant long-term use     Anxiety     Aortic stenosis     Arthritis     Bilateral carotid artery disease     Depression     Encounter for blood transfusion     Hemorrhoids     TIA (transient ischemic attack)        Past Surgical History:   Procedure Laterality Date    HYSTERECTOMY         Family History   Problem Relation Age of Onset    Cancer Mother        Social History     Tobacco Use    Smoking status: Former    Smokeless tobacco: Never   Substance Use Topics    Alcohol use: No    Drug use: No       ROS  Review of Systems   Constitutional:  Negative for fever.   HENT:  Negative for congestion.    Eyes:  Negative for visual disturbance.   Respiratory:  Negative for cough and shortness of breath.    Cardiovascular:  Negative for chest pain.   Gastrointestinal:  Negative for abdominal pain, nausea and vomiting.   Genitourinary:  Negative for dysuria.   Musculoskeletal:  Negative for arthralgias.   Skin:  Negative for color change.   Allergic/Immunologic: Negative for immunocompromised state.   Neurological:  Positive for weakness. Negative for headaches.   Hematological:  Negative for adenopathy. Does not bruise/bleed easily.     Physical Exam  BP (!) 107/57   Pulse 75   Temp 98.2 °F (36.8 °C) (Axillary)   Resp 18   Wt 41.7 kg (92 lb)   LMP  (LMP Unknown)   SpO2 100%    Breastfeeding No   BMI 15.79 kg/m²   Physical Exam    Constitutional: She appears well-nourished. She is cooperative.   Thin appearing elderly female   HENT:   Head: Normocephalic and atraumatic.   Eyes: Conjunctivae, EOM and lids are normal. Pupils are equal, round, and reactive to light.   Neck: Phonation normal.   Normal range of motion.  Cardiovascular:  Normal rate, regular rhythm and intact distal pulses.           Pulmonary/Chest: Breath sounds normal. No stridor. No respiratory distress.   Abdominal: Abdomen is soft. There is no abdominal tenderness.   Genitourinary: Rectum:      Guaiac result positive.   Guaiac positive stool. : Acceptable.  Musculoskeletal:         General: No tenderness or edema.      Cervical back: Normal range of motion.     Neurological: She is alert. No cranial nerve deficit.   Skin: Skin is warm and dry.   Psychiatric: Her speech is normal.             Labs Reviewed   CBC W/ AUTO DIFFERENTIAL - Abnormal; Notable for the following components:       Result Value    RBC 2.20 (*)     Hemoglobin 6.2 (*)     Hematocrit 19.3 (*)     RDW 15.7 (*)     Immature Granulocytes 0.6 (*)     Gran # (ANC) 8.6 (*)     Immature Grans (Abs) 0.06 (*)     Lymph # 0.5 (*)     Gran % 88.3 (*)     Lymph % 5.5 (*)     All other components within normal limits    Narrative:     HCT critical result(s) called and verbal readback obtained from   [facility] in ER by ALEX 09/28/2022 18:57   COMPREHENSIVE METABOLIC PANEL - Abnormal; Notable for the following components:    Sodium 120 (*)     Chloride 90 (*)     Glucose 111 (*)     BUN 26 (*)     Calcium 8.0 (*)     Total Protein 5.9 (*)     Albumin 2.4 (*)     AST 46 (*)     Anion Gap 5 (*)     eGFR 54.9 (*)     All other components within normal limits   OCCULT BLOOD X 1, STOOL - Abnormal; Notable for the following components:    Occult Blood Positive (*)     All other components within normal limits   APTT   PROTIME-INR   SARS-COV-2 RDRP GENE   TYPE &  SCREEN   PREPARE RBC SOFT                          Procedures    ED Course as of 09/28/22 2223   Wed Sep 28, 2022   1858 Hemoglobin(!): 6.2 [NA]   1858 Hematocrit(!!): 19.3 [NA]   1906 Will transfuse 1 unit of PRBC [NA]   1917 Occult Blood(!): Positive [NA]   1918 Sodium(!): 120 [NA]   1918 Chloride(!): 90 [NA]   1934 RRC placed.  Will give protonix infusion and injection. Consult nephrology regarding hyponatremia [NA]   2001 Spoke with nephrology, Dr. Villalba, will give slow fluid correction with NS and blood, hyponatremia will liklely correct on its own.  Will monitor for seizure activity vs worsening mental status   [NA]   2026 Spoke with Dr. Marquez at [facility] [NA]      ED Course User Index  [NA] Sera Foreman MD           Attending Attestation:         Attending Critical Care:   Critical Care Times:   Direct Patient Care (initial evaluation, reassessments, and time considering the case)................................................................20 minutes.   Additional History from reviewing old medical records or taking additional history from the family, EMS, PCP, etc.......................5 minutes.   Ordering, Reviewing, and Interpreting Diagnostic Studies...............................................................................................................5 minutes.   Documentation..................................................................................................................................................................................8 minutes.   Consultation with other Physicians. .................................................................................................................................................7 minutes.   ==============================================================  Total Critical Care Time - exclusive of procedural time: 45 minutes.  ==============================================================  Critical care was necessary  to treat or prevent imminent or life-threatening deterioration of the following conditions: GI bleeding (hyponatremia).   Critical care was time spent personally by me on the following activities: obtaining history from patient or relative, examination of patient, review of old charts, review of x-rays / CT sent with the patient, ordering lab, x-rays, and/or EKG, development of treatment plan with patient or relative, ordering and performing treatments and interventions, evaluation of patient's response to treatment, discussions with primary provider and discussion with consultants.   Critical Care Condition: potentially life-threatening         MDM        Clinical Impression  The primary encounter diagnosis was Gastrointestinal hemorrhage, unspecified gastrointestinal hemorrhage type. A diagnosis of Hyponatremia was also pertinent to this visit.       Sera Foreman MD  09/28/22 8808

## 2022-09-29 PROBLEM — R17 ELEVATED BILIRUBIN: Status: ACTIVE | Noted: 2022-01-01

## 2022-09-29 PROBLEM — Z71.89 GOALS OF CARE, COUNSELING/DISCUSSION: Status: ACTIVE | Noted: 2022-01-01

## 2022-09-29 PROBLEM — K92.2 GI BLEED: Status: ACTIVE | Noted: 2022-01-01

## 2022-09-29 PROBLEM — R53.81 DEBILITY: Status: ACTIVE | Noted: 2022-01-01

## 2022-09-29 PROBLEM — E87.5 HYPERKALEMIA: Status: ACTIVE | Noted: 2022-01-01

## 2022-09-29 NOTE — ASSESSMENT & PLAN NOTE
-appears to be chronic   -reportedly giving substantial amounts of free water through PEG tube; may need to adjust in future

## 2022-09-29 NOTE — SUBJECTIVE & OBJECTIVE
Past Medical History:   Diagnosis Date    Anemia     Anticoagulant long-term use     Anxiety     Aortic stenosis     Arthritis     Bilateral carotid artery disease     Depression     Encounter for blood transfusion     Hemorrhoids     TIA (transient ischemic attack)        Past Surgical History:   Procedure Laterality Date    HYSTERECTOMY         Review of patient's allergies indicates:   Allergen Reactions    Aspirin Nausea Only    Penicillins        Current Facility-Administered Medications on File Prior to Encounter   Medication    [COMPLETED] 0.9%  NaCl infusion    [COMPLETED] pantoprazole injection 40 mg    [DISCONTINUED] 0.9%  NaCl infusion (for blood administration)    [DISCONTINUED] pantoprazole 40 mg in  mL infusion (ready to mix system)    [DISCONTINUED] pantoprazole injection 40 mg     Current Outpatient Medications on File Prior to Encounter   Medication Sig    ALPRAZolam (XANAX) 0.25 MG tablet 1 tablet.    atorvastatin (LIPITOR) 40 MG tablet TAKE 1 TABLET DAILY AT BEDTIME    dipyridamole-aspirin 200-25 mg (AGGRENOX)  mg CM12 Take 1 capsule by mouth 2 (two) times daily.    magnesium oxide (MAG-OX) 400 mg (241.3 mg magnesium) tablet Take 1 tablet (400 mg total) by mouth once daily.    OLANZapine (ZYPREXA) 7.5 MG tablet Take 2 tablets (15 mg total) by mouth every evening.    pantoprazole (PROTONIX) 40 MG tablet Take 40 mg by mouth 2 (two) times daily.    predniSONE (DELTASONE) 10 MG tablet Take 2 tablets (20 mg total) by mouth once daily.    venlafaxine (EFFEXOR) 37.5 MG Tab Take by mouth 2 (two) times daily.    acetaminophen (TYLENOL) 325 MG tablet 2 tablets.    cholecalciferol, vitamin D3, (VITAMIN D3) 50 mcg (2,000 unit) Cap capsule 1 capsule.    cyanocobalamin 500 MCG tablet 1 tablet.    metoprolol succinate (TOPROL-XL) 25 MG 24 hr tablet Take 1 tablet (25 mg total) by mouth once daily.    midodrine (PROAMATINE) 5 MG Tab 2 tablets.    VITAMIN B COMPLEX ORAL 1 capsule.    vitamin E 400 UNIT  capsule 1 capsule.    [DISCONTINUED] cefUROXime (CEFTIN) 250 MG tablet 1 tablet.    [DISCONTINUED] ciprofloxacin HCl (CIPRO) 250 MG tablet 1 tablet.    [DISCONTINUED] cyclobenzaprine (FLEXERIL) 5 MG tablet 1 tablet.    [DISCONTINUED] diazePAM (VALIUM) 2 MG tablet 1 tablet.    [DISCONTINUED] EScitalopram oxalate (LEXAPRO) 20 MG tablet 1 tablet.    [DISCONTINUED] famotidine (PEPCID) 20 MG tablet 1 tablet.    [DISCONTINUED] ferrous gluconate (FERGON) 240 (27 FE) MG tablet 1 tablet.    [DISCONTINUED] fluconazole (DIFLUCAN) 100 MG tablet Take 1 tablet by mouth once daily for 7 days    [DISCONTINUED] losartan (COZAAR) 50 MG tablet 1 tablet.    [DISCONTINUED] metaxalone (SKELAXIN) 800 MG tablet 1 tablet.    [DISCONTINUED] potassium chloride 10% (KAYCIEL) 20 mEq/15 mL oral solution 15 mLs.    [DISCONTINUED] potassium gluconate 550 mg (90 mg) Tab 1 tablet.    [DISCONTINUED] risperiDONE (RISPERDAL) 3 MG Tab 1 tablet.    [DISCONTINUED] sodium bicarbonate 650 MG tablet take 2 tablets by oral route 2 times a day    [DISCONTINUED] sucralfate (CARAFATE) 100 mg/mL suspension 10 mLs.     Family History       Problem Relation (Age of Onset)    Cancer Mother          Tobacco Use    Smoking status: Former    Smokeless tobacco: Never   Substance and Sexual Activity    Alcohol use: No    Drug use: No    Sexual activity: Not on file     Review of Systems   Unable to perform ROS: Dementia   Objective:     Vital Signs (Most Recent):  Temp: 97.5 °F (36.4 °C) (09/29/22 1259)  Pulse: 78 (09/29/22 1259)  Resp: 16 (09/29/22 1259)  BP: (!) 122/56 (09/29/22 1259)  SpO2: 98 % (09/29/22 1259)   Vital Signs (24h Range):  Temp:  [97.5 °F (36.4 °C)-98.2 °F (36.8 °C)] 97.5 °F (36.4 °C)  Pulse:  [70-89] 78  Resp:  [16-20] 16  SpO2:  [98 %-100 %] 98 %  BP: ()/(53-61) 122/56        Body mass index is 15.3 kg/m².    Physical Exam  Vitals reviewed.   Constitutional:       General: She is not in acute distress.     Appearance: She is well-developed. She  is ill-appearing (chronically). She is not diaphoretic.   HENT:      Head: Normocephalic and atraumatic.      Nose: Nose normal.   Eyes:      General: No scleral icterus.     Pupils: Pupils are equal, round, and reactive to light.   Neck:      Vascular: No JVD.      Trachea: No tracheal deviation.   Cardiovascular:      Rate and Rhythm: Normal rate and regular rhythm.      Heart sounds: Normal heart sounds.   Pulmonary:      Effort: Pulmonary effort is normal. No respiratory distress.      Breath sounds: Normal breath sounds.   Abdominal:      General: There is no distension.      Palpations: Abdomen is soft.      Tenderness: There is no abdominal tenderness.      Comments: PEG   Musculoskeletal:         General: No deformity.      Cervical back: Normal range of motion.   Skin:     General: Skin is warm and dry.      Findings: No rash.   Neurological:      Mental Status: She is alert. She is disoriented.   Psychiatric:      Comments: Unable to assess         CRANIAL NERVES     CN III, IV, VI   Pupils are equal, round, and reactive to light.     Significant Labs: All pertinent labs within the past 24 hours have been reviewed.    Significant Imaging: I have reviewed all pertinent imaging results/findings within the past 24 hours.

## 2022-09-29 NOTE — PLAN OF CARE
Pt is disoriented to time, place and situation. No C/O pain or N/V. Pt rested well and tolerated EGD well. Scheduled meds were given per MAR. Bed in lowest position, bed alarm is set. Call light within reach, nurse will continue to monitor.

## 2022-09-29 NOTE — HPI
87-year-old female with history of anemia, anxiety, aortic stenosis, depression and dementia who presents due to abnormal labs.  Patient sent in by her PCP due to low hemoglobin.  Patient's son is not endorsing any blood per rectum, hemoptysis, hematemesis, or hematuria.  No prior history of GI bleed in the past.  Other than weakness no specific symptoms were endorsed.     In ED pt was found to have significant drop in hgb. GI consulted for evalution

## 2022-09-29 NOTE — ASSESSMENT & PLAN NOTE
- hemodynamically stable; broad differential for causes including peptic ulcer disease, Heyde's syndrome, PEG irritation  - Pathway initiated  - 2 LBIV  - orthostatics ordered  - initiated on protonix drip  - hold anticoagulation  - trend CBC tid for now; transfuse Hb <7  - NPO  - GI consulted: plan for EGD today

## 2022-09-29 NOTE — H&P
"[facility]  History & Physical    Patient Name: Kristen Draper  MRN: [id number]  Patient Class: IP- Inpatient  Admission Date: 9/29/2022  Attending Physician: Carlos Linda MD  Primary Care Provider: Leyda Alarcon MD         Patient information was obtained from relative(s), past medical records and ER records.     Subjective:     Principal Problem:GI bleed    Chief Complaint:   Chief Complaint   Patient presents with    GI Bleeding        HPI: Per , "Ms. Draper is an 88yo lady with a past medical history of dementia, TIA, AS, Carotid disease, depression, and anemia.   She was sent to the ED by her PCP after finding a Hg of 5.8 on labs.  Her son provides all the history due to her dementia.  She is DNR.     At home she is on Lopressor, Aggrenox, and Protonix.  In the ED she was heme positive on hemmocult, but she has had no BRBPR or any melena.      BP (!) 98/53   Pulse 79   Temp 98.2 °F (36.8 °C) (Axillary)   Resp 18   Wt 41.7 kg (92 lb)   LMP  (LMP Unknown)   SpO2 100%   Breastfeeding No   BMI 15.79 kg/m².  Labs showed Hg 6.2, , Na 120, Cr 1, BUN 26.     No radiographs were done in the ED.  EKG showed Normal sinus rhythm 80, Left axis deviation, Incomplete right bundle branch block, Nonspecific T wave abnormality now evident in Inferior leads, Confirmed by Osiel REA MD ([id number]) on 9/2/2022 5:45:43 PM     In the ED she was treated with NS 75 cc/he 2015, Protonix 40mg iv then 8mg/hr at 1945.  She was ordered one Unit PRBC to be transufsed to keep Hg >7g."    Seen at the bedside with daughter, reports a few days of abdominal discomfort. She is DNR.      Past Medical History:   Diagnosis Date    Anemia     Anticoagulant long-term use     Anxiety     Aortic stenosis     Arthritis     Bilateral carotid artery disease     Depression     Encounter for blood transfusion     Hemorrhoids     TIA (transient ischemic attack)        Past Surgical " History:   Procedure Laterality Date    HYSTERECTOMY         Review of patient's allergies indicates:   Allergen Reactions    Aspirin Nausea Only    Penicillins        Current Facility-Administered Medications on File Prior to Encounter   Medication    [COMPLETED] 0.9%  NaCl infusion    [COMPLETED] pantoprazole injection 40 mg    [DISCONTINUED] 0.9%  NaCl infusion (for blood administration)    [DISCONTINUED] pantoprazole 40 mg in  mL infusion (ready to mix system)    [DISCONTINUED] pantoprazole injection 40 mg     Current Outpatient Medications on File Prior to Encounter   Medication Sig    ALPRAZolam (XANAX) 0.25 MG tablet 1 tablet.    atorvastatin (LIPITOR) 40 MG tablet TAKE 1 TABLET DAILY AT BEDTIME    dipyridamole-aspirin 200-25 mg (AGGRENOX)  mg CM12 Take 1 capsule by mouth 2 (two) times daily.    magnesium oxide (MAG-OX) 400 mg (241.3 mg magnesium) tablet Take 1 tablet (400 mg total) by mouth once daily.    OLANZapine (ZYPREXA) 7.5 MG tablet Take 2 tablets (15 mg total) by mouth every evening.    pantoprazole (PROTONIX) 40 MG tablet Take 40 mg by mouth 2 (two) times daily.    predniSONE (DELTASONE) 10 MG tablet Take 2 tablets (20 mg total) by mouth once daily.    venlafaxine (EFFEXOR) 37.5 MG Tab Take by mouth 2 (two) times daily.    acetaminophen (TYLENOL) 325 MG tablet 2 tablets.    cholecalciferol, vitamin D3, (VITAMIN D3) 50 mcg (2,000 unit) Cap capsule 1 capsule.    cyanocobalamin 500 MCG tablet 1 tablet.    metoprolol succinate (TOPROL-XL) 25 MG 24 hr tablet Take 1 tablet (25 mg total) by mouth once daily.    midodrine (PROAMATINE) 5 MG Tab 2 tablets.    VITAMIN B COMPLEX ORAL 1 capsule.    vitamin E 400 UNIT capsule 1 capsule.    [DISCONTINUED] cefUROXime (CEFTIN) 250 MG tablet 1 tablet.    [DISCONTINUED] ciprofloxacin HCl (CIPRO) 250 MG tablet 1 tablet.    [DISCONTINUED] cyclobenzaprine (FLEXERIL) 5 MG tablet 1 tablet.    [DISCONTINUED] diazePAM (VALIUM) 2 MG  tablet 1 tablet.    [DISCONTINUED] EScitalopram oxalate (LEXAPRO) 20 MG tablet 1 tablet.    [DISCONTINUED] famotidine (PEPCID) 20 MG tablet 1 tablet.    [DISCONTINUED] ferrous gluconate (FERGON) 240 (27 FE) MG tablet 1 tablet.    [DISCONTINUED] fluconazole (DIFLUCAN) 100 MG tablet Take 1 tablet by mouth once daily for 7 days    [DISCONTINUED] losartan (COZAAR) 50 MG tablet 1 tablet.    [DISCONTINUED] metaxalone (SKELAXIN) 800 MG tablet 1 tablet.    [DISCONTINUED] potassium chloride 10% (KAYCIEL) 20 mEq/15 mL oral solution 15 mLs.    [DISCONTINUED] potassium gluconate 550 mg (90 mg) Tab 1 tablet.    [DISCONTINUED] risperiDONE (RISPERDAL) 3 MG Tab 1 tablet.    [DISCONTINUED] sodium bicarbonate 650 MG tablet take 2 tablets by oral route 2 times a day    [DISCONTINUED] sucralfate (CARAFATE) 100 mg/mL suspension 10 mLs.     Family History       Problem Relation (Age of Onset)    Cancer Mother          Tobacco Use    Smoking status: Former    Smokeless tobacco: Never   Substance and Sexual Activity    Alcohol use: No    Drug use: No    Sexual activity: Not on file     Review of Systems   Unable to perform ROS: Dementia   Objective:     Vital Signs (Most Recent):  Temp: 97.5 °F (36.4 °C) (09/29/22 1259)  Pulse: 78 (09/29/22 1259)  Resp: 16 (09/29/22 1259)  BP: (!) 122/56 (09/29/22 1259)  SpO2: 98 % (09/29/22 1259)   Vital Signs (24h Range):  Temp:  [97.5 °F (36.4 °C)-98.2 °F (36.8 °C)] 97.5 °F (36.4 °C)  Pulse:  [70-89] 78  Resp:  [16-20] 16  SpO2:  [98 %-100 %] 98 %  BP: ()/(53-61) 122/56        Body mass index is 15.3 kg/m².    Physical Exam  Vitals reviewed.   Constitutional:       General: She is not in acute distress.     Appearance: She is well-developed. She is ill-appearing (chronically). She is not diaphoretic.   HENT:      Head: Normocephalic and atraumatic.      Nose: Nose normal.   Eyes:      General: No scleral icterus.     Pupils: Pupils are equal, round, and reactive to light.   Neck:       Vascular: No JVD.      Trachea: No tracheal deviation.   Cardiovascular:      Rate and Rhythm: Normal rate and regular rhythm.      Heart sounds: Normal heart sounds.   Pulmonary:      Effort: Pulmonary effort is normal. No respiratory distress.      Breath sounds: Normal breath sounds.   Abdominal:      General: There is no distension.      Palpations: Abdomen is soft.      Tenderness: There is no abdominal tenderness.      Comments: PEG   Musculoskeletal:         General: No deformity.      Cervical back: Normal range of motion.   Skin:     General: Skin is warm and dry.      Findings: No rash.   Neurological:      Mental Status: She is alert. She is disoriented.   Psychiatric:      Comments: Unable to assess         CRANIAL NERVES     CN III, IV, VI   Pupils are equal, round, and reactive to light.     Significant Labs: All pertinent labs within the past 24 hours have been reviewed.    Significant Imaging: I have reviewed all pertinent imaging results/findings within the past 24 hours.    Assessment/Plan:     * GI bleed  - hemodynamically stable; broad differential for causes including peptic ulcer disease, Heyde's syndrome, PEG irritation  - Pathway initiated  - 2 LBIV  - orthostatics ordered  - initiated on protonix drip  - hold anticoagulation  - trend CBC tid for now; transfuse Hb <7  - NPO  - GI consulted: plan for EGD today        Debility  -significant debility; bed-bound at baseline with PEG tube   -patient does not speak      Goals of care, counseling/discussion  Advance Care Planning     Date: 09/29/2022    Code Status  In light of the patients advanced and life limiting illness,I engaged the the family in a conversation about the patient's preferences for care  at the very end of life. The patient wishes to have a natural, peaceful death.  Along those lines, the patient does not wish to have CPR or other invasive treatments performed when her heart and/or breathing stops. I communicated to the  family that a DNR order would be placed in her medical record to reflect this preference.  I spent a total of 15 minutes engaging the patient in this advance care planning discussion.             Elevated bilirubin  -in setting of hemolysis, will recheck  -fractionate    Hyperkalemia  -noted in hemolyzed sample at time of admission   -repeat BMP; will defer treatment      Hyponatremia  -appears to be chronic   -reportedly giving substantial amounts of free water through PEG tube; may need to adjust in future      Benign essential hypertension  -stable; holding any home antihypertensives in the setting of GI bleed      Aortic valve stenosis  -noted        VTE Risk Mitigation (From admission, onward)         Ordered     IP VTE HIGH RISK PATIENT  Once         09/29/22 0316     Place sequential compression device  Until discontinued         09/29/22 0316                   Carlos Linda MD  Department of Hospital Medicine   [place] [facility] ([facility])

## 2022-09-29 NOTE — CONSULTS
[facility] Surg  Wound Care    Patient Name:  Kristen Draper   MRN:  [id number]  Date: 9/29/2022  Diagnosis: <principal problem not specified>    History:     Past Medical History:   Diagnosis Date    Anemia     Anticoagulant long-term use     Anxiety     Aortic stenosis     Arthritis     Bilateral carotid artery disease     Depression     Encounter for blood transfusion     Hemorrhoids     TIA (transient ischemic attack)        Social History     Socioeconomic History    Marital status:    Tobacco Use    Smoking status: Former    Smokeless tobacco: Never   Substance and Sexual Activity    Alcohol use: No    Drug use: No     Social Determinants of Health     Financial Resource Strain: Low Risk     Difficulty of Paying Living Expenses: Not hard at all   Food Insecurity: No Food Insecurity    Worried About Running Out of Food in the Last Year: Never true    Ran Out of Food in the Last Year: Never true   Transportation Needs: No Transportation Needs    Lack of Transportation (Medical): No    Lack of Transportation (Non-Medical): No   Physical Activity: Inactive    Days of Exercise per Week: 0 days    Minutes of Exercise per Session: 0 min   Stress: No Stress Concern Present    Feeling of Stress : Only a little   Social Connections: Moderately Isolated    Frequency of Communication with Friends and Family: More than three times a week    Frequency of Social Gatherings with Friends and Family: More than three times a week    Attends Bahai Services: Never    Active Member of Clubs or Organizations: No    Attends Club or Organization Meetings: Never    Marital Status:    Housing Stability: Unknown    Unable to Pay for Housing in the Last Year: No    Unstable Housing in the Last Year: No       Precautions:     Allergies as of 09/28/2022 - Reviewed 09/28/2022   Allergen Reaction Noted    Aspirin Nausea Only 07/24/2017    Penicillins  09/02/2022       [facility] Assessment Details/Treatment     Sacrum-  unstageable pressure injury- present on admit- 100% slough- scar tissue intact to periwound  R hip and buttocks- scattered red dry discolored areas and scar tissue        R heel intact        L heel- intact callous        Back- scar tissue      Recommendations discussed with pt, pt daughter at bedside, and Dr. Linda-  - Nursing to maintain pressure injury prevention interventions to include waffle overlay and heel boots  - Triad ointment to sacrum wound BID and prn incontinent episode      09/29/2022

## 2022-09-29 NOTE — ANESTHESIA POSTPROCEDURE EVALUATION
Anesthesia Post Evaluation    Patient: Kristen Draper    Procedure(s) Performed: Procedure(s) (LRB):  EGD (ESOPHAGOGASTRODUODENOSCOPY) (N/A)    Final Anesthesia Type: MAC      Patient location during evaluation: GI PACU  Patient participation: No - Unable to Participate, Coma/Other Inability to Communicate  Level of consciousness: awake and alert  Post-procedure vital signs: reviewed and stable  Pain management: adequate  Airway patency: patent    PONV status at discharge: No PONV  Anesthetic complications: no      Cardiovascular status: hemodynamically stable  Respiratory status: unassisted, spontaneous ventilation and room air  Hydration status: euvolemic  Follow-up not needed.          Vitals Value Taken Time   /54 09/29/22 1651   Temp 36.7 09/29/22 1651   Pulse 86 09/29/22 1651   Resp 18 09/29/22 1651   SpO2 100 09/29/22 1651         No case tracking events are documented in the log.      Pain/Tracie Score: No data recorded

## 2022-09-29 NOTE — PROVIDER TRANSFER
Outside Transfer Acceptance Note / Regional Referral Center    Referring facility: [facility]   Referring provider: EV COREY  Accepting facility: [facility]  Accepting provider: BA BURNETT    Admitting provider: CARMEN ON CALL  Reason for transfer: Higher Level of Care   Transfer diagnosis: GI bleed, hyponatremia  Transfer specialty requested: Gastroenterology  Transfer specialty notified: no  Transfer level: NUMBER 1-5: 2  Bed type requested: MED TELE  Isolation status: No active isolations   Admission class or status: IP- Inpatient      Narrative     Ms. Draper is an 86yo lady with a past medical history of dementia, TIA, AS, Carotid disease, depression, and anemia.   She was sent to the ED by her PCP after finding a Hg of 5.8 on labs.  Her son provides all the history due to her dementia.  She is DNR.    At home she is on Lopressor, Aggrenox, and Protonix.  In the ED she was heme positive on hemmocult, but she has had no BRBPR or any melena.     BP (!) 98/53   Pulse 79   Temp 98.2 °F (36.8 °C) (Axillary)   Resp 18   Wt 41.7 kg (92 lb)   LMP  (LMP Unknown)   SpO2 100%   Breastfeeding No   BMI 15.79 kg/m².  Labs showed Hg 6.2, , Na 120, Cr 1, BUN 26.    No radiographs were done in the ED.  EKG showed Normal sinus rhythm 80, Left axis deviation, Incomplete right bundle branch block, Nonspecific T wave abnormality now evident in Inferior leads, Confirmed by Osiel REA MD ([id number]) on 9/2/2022 5:45:43 PM    In the ED she was treated with NS 75 cc/he 2015, Protonix 40mg iv then 8mg/hr at 1945.  She was ordered one Unit PRBC to be transufsed to keep Hg >7g.  Objective     Vitals:   Temp:  [98.2 °F (36.8 °C)]   Pulse:  [79-89]   Resp:  [18-20]   BP: ()/(53-59)   SpO2:  [100 %]      Recent Labs: All pertinent labs within the past 24 hours have been reviewed.    IV access:  '  Infusions: PROTONIX 8MG/HR    Allergies:   Review of patient's allergies indicates:    Allergen Reactions    Aspirin Nausea Only    Penicillins       NPO: No    Anticoagulation:   Anticoagulants       None             Instructions      [facility]  Admit to Hospital Medicine  Upon patient arrival to floor, please contact Hospital Medicine on call.

## 2022-09-29 NOTE — TRANSFER OF CARE
"Anesthesia Transfer of Care Note    Patient: Kristen Draper    Procedure(s) Performed: Procedure(s) (LRB):  EGD (ESOPHAGOGASTRODUODENOSCOPY) (N/A)    Patient location: GI    Anesthesia Type: MAC    Transport from OR: Transported from OR on room air with adequate spontaneous ventilation    Post pain: adequate analgesia    Post assessment: no apparent anesthetic complications and tolerated procedure well    Post vital signs: stable    Level of consciousness: responds to stimulation and sedated    Nausea/Vomiting: no nausea/vomiting    Complications: none    Transfer of care protocol was followed      Last vitals:   Visit Vitals  BP (!) 126/59 (BP Location: Left arm, Patient Position: Lying)   Pulse 83   Temp 37 °C (98.6 °F) (Temporal)   Resp 18   Ht 5' 5" (1.651 m)   LMP  (LMP Unknown)   SpO2 100%   Breastfeeding No   BMI 15.30 kg/m²     "

## 2022-09-29 NOTE — ASSESSMENT & PLAN NOTE
Advance Care Planning     Date: 09/29/2022    Code Status  In light of the patients advanced and life limiting illness,I engaged the the family in a conversation about the patient's preferences for care  at the very end of life. The patient wishes to have a natural, peaceful death.  Along those lines, the patient does not wish to have CPR or other invasive treatments performed when her heart and/or breathing stops. I communicated to the family that a DNR order would be placed in her medical record to reflect this preference.  I spent a total of 15 minutes engaging the patient in this advance care planning discussion.

## 2022-09-29 NOTE — PROVATION PATIENT INSTRUCTIONS
Discharge Summary/Instructions after an Endoscopic Procedure  Patient Name: Kristen Draper  Patient MRN: [id number]  Patient YOB: 1935  Thursday, September 29, 2022  Bashir Hall MD  Dear patient,  As a result of recent federal legislation (The Federal Cures Act), you may   receive lab or pathology results from your procedure in your [facility]   account before your physician is able to contact you. Your physician or   their representative will relay the results to you with their   recommendations at their soonest availability.  Thank you,  Your health is very important to us during the Covid Crisis. Following your   procedure today, you will receive a daily text for 2 weeks asking about   signs or symptoms of Covid 19.  Please respond to this text when you   receive it so we can follow up and keep you as safe as possible.   RESTRICTIONS:  During your procedure today, you received medications for sedation.  These   medications may affect your judgment, balance and coordination.  Therefore,   for 24 hours, you have the following restrictions:   - DO NOT drive a car, operate machinery, make legal/financial decisions,   sign important papers or drink alcohol.    ACTIVITY:  Today: no heavy lifting, straining or running due to procedural   sedation/anesthesia.  The following day: return to full activity including work.  DIET:  Eat and drink normally unless instructed otherwise.     TREATMENT FOR COMMON SIDE EFFECTS:  - Mild abdominal pain, nausea, belching, bloating or excessive gas:  rest,   eat lightly and use a heating pad.  - Sore Throat: treat with throat lozenges and/or gargle with warm salt   water.  - Because air was used during the procedure, expelling large amounts of air   from your rectum or belching is normal.  - If a bowel prep was taken, you may not have a bowel movement for 1-3 days.    This is normal.  SYMPTOMS TO WATCH FOR AND REPORT TO YOUR PHYSICIAN:  1. Abdominal pain or  bloating, other than gas cramps.  2. Chest pain.  3. Back pain.  4. Signs of infection such as: chills or fever occurring within 24 hours   after the procedure.  5. Rectal bleeding, which would show as bright red, maroon, or black stools.   (A tablespoon of blood from the rectum is not serious, especially if   hemorrhoids are present.)  6. Vomiting.  7. Weakness or dizziness.  GO DIRECTLY TO THE NEAREST EMERGENCY ROOM IF YOU HAVE ANY OF THE FOLLOWING:      Difficulty breathing              Chills and/or fever over 101 F   Persistent vomiting and/or vomiting blood   Severe abdominal pain   Severe chest pain   Black, tarry stools   Bleeding- more than one tablespoon   Any other symptom or condition that you feel may need urgent attention  Your doctor recommends these additional instructions:  If any biopsies were taken, your doctors clinic will contact you in 1 to 2   weeks with any results.  - Return patient to [facility] for ongoing care.   - Resume previous diet.   - Continue present medications.   - Observe patient's clinical course.  For questions, problems or results please call your physician - Bashir Hall MD.  EMERGENCY PHONE NUMBER: [phone],  LAB RESULTS: [phone]  IF A COMPLICATION OR EMERGENCY SITUATION ARISES AND YOU ARE UNABLE TO REACH   YOUR PHYSICIAN - GO DIRECTLY TO THE EMERGENCY ROOM.  Bashir Hall MD  9/29/2022 5:09:20 PM  This report has been verified and signed electronically.  Dear patient,  As a result of recent federal legislation (The Federal Cures Act), you may   receive lab or pathology results from your procedure in your [facility]   account before your physician is able to contact you. Your physician or   their representative will relay the results to you with their   recommendations at their soonest availability.  Thank you,  PROVATION

## 2022-09-29 NOTE — ANESTHESIA PREPROCEDURE EVALUATION
2022  Kristen Drpaer is a 87 y.o., female. past medical history of dementia, TIA, severe AS (RUBIA 0.8 cm2), bilateral Carotid disease, depression, and anemia with suspected GIB. Sent to ED by PCP for Hgb 5.9, s/p 1u PRBCs     S/F EGD     Patient per family knows her name and , but otherwise demented and doesn't often talk at all.     Review of patient's allergies indicates:   Allergen Reactions    Aspirin Nausea Only    Penicillins        Patient Active Problem List   Diagnosis    Aortic valve stenosis    Syncope    Anemia    Benign essential hypertension    Cerebral infarction    Depression, major, recurrent    Generalized anxiety disorder    Hyponatremia       Past Surgical History:   Procedure Laterality Date    HYSTERECTOMY         Lab Results   Component Value Date    WBC 11.20 2022    HGB 8.4 (L) 2022    HCT 25.0 (L) 2022     2022    CHOL 220 (H) 2021    TRIG 143 2021    HDL 89 (H) 2021    ALT 28 2022    AST 35 2022     (L) 2022    K 4.1 2022    CL 96 2022    CREATININE 0.9 2022    BUN 22 2022    CO2 19 (L) 2022    TSH 4.090 (H) 2021    INR 0.9 2022           Pre-op Assessment    I have reviewed the Patient Summary Reports.     I have reviewed the Nursing Notes.       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)), AS CHF Occlusion and stenosis of bilateral carotid arteries   Hepatic/GI:   g tube    Neurological:   TIA,    Psych:   Psychiatric History depression DEMENTIA          Physical Exam  General: Cooperative and Alert  Stated yes when I read off her birthdate to daughter, but otherwise would not answer questions    Airway:  Mallampati: unable to assess   Patient unable to follow commands  Dental:  Edentulous    Musculoskeletal:  Joint Contracture  Contractures   Skin:Multiple pressure ulcers       Anesthesia Plan  Type of Anesthesia, risks & benefits discussed:    Anesthesia Type: Gen ETT, MAC  Intra-op Monitoring Plan: Standard ASA Monitors  Induction:  IV  Informed Consent: Informed consent signed with the Patient representative and all parties understand the risks and agree with anesthesia plan.  All questions answered.   ASA Score: 4  Day of Surgery Review of History & Physical: H&P Update referred to the surgeon/provider.  Anesthesia Plan Notes: Consent signed with daughter Keo at bedside. Daughter Lindsey also on phone listening to anesthesia conversation, all questions answered.     Chronic hyponatremia     Ready For Surgery From Anesthesia Perspective.     .

## 2022-09-29 NOTE — SUBJECTIVE & OBJECTIVE
Past Medical History:   Diagnosis Date    Anemia     Anticoagulant long-term use     Anxiety     Aortic stenosis     Arthritis     Bilateral carotid artery disease     Depression     Encounter for blood transfusion     Hemorrhoids     TIA (transient ischemic attack)        Past Surgical History:   Procedure Laterality Date    HYSTERECTOMY         Review of patient's allergies indicates:   Allergen Reactions    Aspirin Nausea Only    Penicillins      Family History       Problem Relation (Age of Onset)    Cancer Mother          Tobacco Use    Smoking status: Former    Smokeless tobacco: Never   Substance and Sexual Activity    Alcohol use: No    Drug use: No    Sexual activity: Not on file     Review of Systems   Unable to perform ROS: Dementia   Objective:     Vital Signs (Most Recent):  Temp: 97.5 °F (36.4 °C) (09/29/22 1259)  Pulse: 78 (09/29/22 1259)  Resp: 16 (09/29/22 1259)  BP: (!) 122/56 (09/29/22 1259)  SpO2: 98 % (09/29/22 1259)   Vital Signs (24h Range):  Temp:  [97.5 °F (36.4 °C)-98.2 °F (36.8 °C)] 97.5 °F (36.4 °C)  Pulse:  [70-89] 78  Resp:  [16-20] 16  SpO2:  [98 %-100 %] 98 %  BP: ()/(53-61) 122/56        Body mass index is 15.3 kg/m².    No intake or output data in the 24 hours ending 09/29/22 1401    Lines/Drains/Airways       Peripheral Intravenous Line  Duration                  Peripheral IV - Single Lumen 09/28/22 1830 20 G Right Antecubital <1 day         Peripheral IV - Single Lumen 09/28/22 2120 22 G Left Forearm <1 day                    Physical Exam  Vitals and nursing note reviewed.   Constitutional:       General: She is not in acute distress.     Appearance: She is well-developed. She is ill-appearing. She is not toxic-appearing.   HENT:      Head: Normocephalic and atraumatic.   Eyes:      General: No scleral icterus.     Pupils: Pupils are equal, round, and reactive to light.   Cardiovascular:      Rate and Rhythm: Normal rate and regular rhythm.      Heart sounds: Normal heart  sounds.   Pulmonary:      Effort: Pulmonary effort is normal. No respiratory distress.      Breath sounds: Normal breath sounds.   Abdominal:      General: Bowel sounds are normal. There is no distension.      Palpations: Abdomen is soft.      Tenderness: There is no abdominal tenderness.   Musculoskeletal:         General: Normal range of motion.      Cervical back: Normal range of motion and neck supple.   Skin:     General: Skin is warm and dry.      Findings: No erythema or rash.   Neurological:      Mental Status: She is alert. Mental status is at baseline. She is disoriented.      Comments: No asterixis       Significant Labs:  Recent Lab Results  (Last 5 results in the past 24 hours)        09/29/22  1330   09/29/22  1148   09/29/22  0749   09/29/22  0450   09/29/22  0441        Albumin   2.5             Alkaline Phosphatase   80             ALT   28             Anion Gap   8             AST   35             Baso # 0.03     0.02     0.01       Basophil % 0.3     0.2     0.1       Bilirubin, Direct   0.7             BILIRUBIN TOTAL   1.3  Comment: For infants and newborns, interpretation of results should be based  on gestational age, weight and in agreement with clinical  observations.    Premature Infant recommended reference ranges:  Up to 24 hours.............<8.0 mg/dL  Up to 48 hours............<12.0 mg/dL  3-5 days..................<15.0 mg/dL  6-29 days.................<15.0 mg/dL               BUN   22             Calcium   8.2             Chloride   96             CO2   19             Creatinine   0.9             Differential Method Automated     Automated     Automated       eGFR   >60             Eos # 0.1     0.1     0.1       Eosinophil % 0.9     0.5     0.7       Glucose   78             Gran # (ANC) 7.5     8.5     8.4       Gran % 80.2     76.0     78.4       Group & Rh         AB POS       Hematocrit 26.2     25.0     24.4       Hemoglobin 9.0     8.4     8.1       Immature Grans (Abs)  0.07  Comment: Mild elevation in immature granulocytes is non specific and   can be seen in a variety of conditions including stress response,   acute inflammation, trauma and pregnancy. Correlation with other   laboratory and clinical findings is essential.       0.08  Comment: Mild elevation in immature granulocytes is non specific and   can be seen in a variety of conditions including stress response,   acute inflammation, trauma and pregnancy. Correlation with other   laboratory and clinical findings is essential.       0.07  Comment: Mild elevation in immature granulocytes is non specific and   can be seen in a variety of conditions including stress response,   acute inflammation, trauma and pregnancy. Correlation with other   laboratory and clinical findings is essential.         Immature Granulocytes 0.8     0.7     0.7       INDIRECT IWONA         NEG       Lymph # 0.7     1.3     0.9       Lymph % 7.3     11.6     8.6       MCH 30.6     30.1     30.0       MCHC 34.4     33.6     33.2       MCV 89     90     90       Mono # 1.0     1.2     1.2       Mono % 10.5     11.0     11.5       MPV 9.9     10.0     9.9       nRBC 0     0     0       Platelets 174     175     171       POCT Glucose       84         Potassium   4.1             PROTEIN TOTAL   5.5             RBC 2.94     2.79     2.70       RDW 15.7     15.1     15.3       Sodium   123             WBC 9.33     11.20     10.74                              Significant Imaging:  Imaging results within the past 24 hours have been reviewed.

## 2022-09-29 NOTE — NURSING
On call provider notified that pt is in room ready to be seen. DNR status needs to be reviewed. Diet and medication route orders need to be reviewed as pt receives them via peg tube at home. Son at bedside to answer questions.  Per on call provider, pt will be seen by morning team. No new orders, will continue to monitor.

## 2022-09-29 NOTE — PROGRESS NOTES
09/29/22 0330   Admission   Initial VN Admission Questions Complete   Communication Issues? None   Shift   Virtual Nurse - Patient Verbalized Approval Of Camera Use   Safety/Activity   Patient Rounds visualized patient   Safety Promotion/Fall Prevention instructed to call staff for mobility;side rails raised x 2;Fall Risk reviewed with patient/family   VIRTUAL NURSE: Pt arrived to unit from [facility] [place]. Admission questions completed with patient's son at this time.Safety precautions reviewed. No complaints verbalized, no needs expressed. Instructed to use call light for assistance, he verbalized understanding. Bedside nurse to notify on team of patients arrival to unit.

## 2022-09-29 NOTE — HPI
"Per , "Ms. Draper is an 86yo lady with a past medical history of dementia, TIA, AS, Carotid disease, depression, and anemia.   She was sent to the ED by her PCP after finding a Hg of 5.8 on labs.  Her son provides all the history due to her dementia.  She is DNR.     At home she is on Lopressor, Aggrenox, and Protonix.  In the ED she was heme positive on hemmocult, but she has had no BRBPR or any melena.      BP (!) 98/53   Pulse 79   Temp 98.2 °F (36.8 °C) (Axillary)   Resp 18   Wt 41.7 kg (92 lb)   LMP  (LMP Unknown)   SpO2 100%   Breastfeeding No   BMI 15.79 kg/m².  Labs showed Hg 6.2, , Na 120, Cr 1, BUN 26.     No radiographs were done in the ED.  EKG showed Normal sinus rhythm 80, Left axis deviation, Incomplete right bundle branch block, Nonspecific T wave abnormality now evident in Inferior leads, Confirmed by Osiel REA MD ([id number]) on 9/2/2022 5:45:43 PM     In the ED she was treated with NS 75 cc/he 2015, Protonix 40mg iv then 8mg/hr at 1945.  She was ordered one Unit PRBC to be transufsed to keep Hg >7g."    Seen at the bedside with daughter, reports a few days of abdominal discomfort. She is DNR.  "

## 2022-09-29 NOTE — CONSULTS
[facility] Surg  Gastroenterology  Consult Note    Patient Name: Kristen Draper  MRN: [id number]  Admission Date: 9/29/2022  Hospital Length of Stay: 0 days  Code Status: DNR   Attending Provider: Carlos Linda, *   Consulting Provider: Bashir Hall MD  Primary Care Physician: Leyda Alarcon MD  Principal Problem:GI bleed    Inpatient consult to Gastroenterology  Consult performed by: Bashir Hall MD  Consult ordered by: Eleanor Zavala NP        Subjective:     HPI:  87-year-old female with history of anemia, anxiety, aortic stenosis, depression and dementia who presents due to abnormal labs.  Patient sent in by her PCP due to low hemoglobin.  Patient's son is not endorsing any blood per rectum, hemoptysis, hematemesis, or hematuria.  No prior history of GI bleed in the past.  Other than weakness no specific symptoms were endorsed.     In ED pt was found to have significant drop in hgb. GI consulted for evalution      Past Medical History:   Diagnosis Date    Anemia     Anticoagulant long-term use     Anxiety     Aortic stenosis     Arthritis     Bilateral carotid artery disease     Depression     Encounter for blood transfusion     Hemorrhoids     TIA (transient ischemic attack)        Past Surgical History:   Procedure Laterality Date    HYSTERECTOMY         Review of patient's allergies indicates:   Allergen Reactions    Aspirin Nausea Only    Penicillins      Family History       Problem Relation (Age of Onset)    Cancer Mother          Tobacco Use    Smoking status: Former    Smokeless tobacco: Never   Substance and Sexual Activity    Alcohol use: No    Drug use: No    Sexual activity: Not on file     Review of Systems   Unable to perform ROS: Dementia   Objective:     Vital Signs (Most Recent):  Temp: 97.5 °F (36.4 °C) (09/29/22 1259)  Pulse: 78 (09/29/22 1259)  Resp: 16 (09/29/22 1259)  BP: (!) 122/56 (09/29/22 1259)  SpO2: 98 % (09/29/22 1259)    Vital Signs (24h Range):  Temp:  [97.5 °F (36.4 °C)-98.2 °F (36.8 °C)] 97.5 °F (36.4 °C)  Pulse:  [70-89] 78  Resp:  [16-20] 16  SpO2:  [98 %-100 %] 98 %  BP: ()/(53-61) 122/56        Body mass index is 15.3 kg/m².    No intake or output data in the 24 hours ending 09/29/22 1401    Lines/Drains/Airways       Peripheral Intravenous Line  Duration                  Peripheral IV - Single Lumen 09/28/22 1830 20 G Right Antecubital <1 day         Peripheral IV - Single Lumen 09/28/22 2120 22 G Left Forearm <1 day                    Physical Exam  Vitals and nursing note reviewed.   Constitutional:       General: She is not in acute distress.     Appearance: She is well-developed. She is ill-appearing. She is not toxic-appearing.   HENT:      Head: Normocephalic and atraumatic.   Eyes:      General: No scleral icterus.     Pupils: Pupils are equal, round, and reactive to light.   Cardiovascular:      Rate and Rhythm: Normal rate and regular rhythm.      Heart sounds: Normal heart sounds.   Pulmonary:      Effort: Pulmonary effort is normal. No respiratory distress.      Breath sounds: Normal breath sounds.   Abdominal:      General: Bowel sounds are normal. There is no distension.      Palpations: Abdomen is soft.      Tenderness: There is no abdominal tenderness.   Musculoskeletal:         General: Normal range of motion.      Cervical back: Normal range of motion and neck supple.   Skin:     General: Skin is warm and dry.      Findings: No erythema or rash.   Neurological:      Mental Status: She is alert. Mental status is at baseline. She is disoriented.      Comments: No asterixis       Significant Labs:  Recent Lab Results  (Last 5 results in the past 24 hours)        09/29/22  1330   09/29/22  1148   09/29/22  0749   09/29/22  0450   09/29/22  0441        Albumin   2.5             Alkaline Phosphatase   80             ALT   28             Anion Gap   8             AST   35             Baso # 0.03     0.02      0.01       Basophil % 0.3     0.2     0.1       Bilirubin, Direct   0.7             BILIRUBIN TOTAL   1.3  Comment: For infants and newborns, interpretation of results should be based  on gestational age, weight and in agreement with clinical  observations.    Premature Infant recommended reference ranges:  Up to 24 hours.............<8.0 mg/dL  Up to 48 hours............<12.0 mg/dL  3-5 days..................<15.0 mg/dL  6-29 days.................<15.0 mg/dL               BUN   22             Calcium   8.2             Chloride   96             CO2   19             Creatinine   0.9             Differential Method Automated     Automated     Automated       eGFR   >60             Eos # 0.1     0.1     0.1       Eosinophil % 0.9     0.5     0.7       Glucose   78             Gran # (ANC) 7.5     8.5     8.4       Gran % 80.2     76.0     78.4       Group & Rh         AB POS       Hematocrit 26.2     25.0     24.4       Hemoglobin 9.0     8.4     8.1       Immature Grans (Abs) 0.07  Comment: Mild elevation in immature granulocytes is non specific and   can be seen in a variety of conditions including stress response,   acute inflammation, trauma and pregnancy. Correlation with other   laboratory and clinical findings is essential.       0.08  Comment: Mild elevation in immature granulocytes is non specific and   can be seen in a variety of conditions including stress response,   acute inflammation, trauma and pregnancy. Correlation with other   laboratory and clinical findings is essential.       0.07  Comment: Mild elevation in immature granulocytes is non specific and   can be seen in a variety of conditions including stress response,   acute inflammation, trauma and pregnancy. Correlation with other   laboratory and clinical findings is essential.         Immature Granulocytes 0.8     0.7     0.7       INDIRECT IWONA         NEG       Lymph # 0.7     1.3     0.9       Lymph % 7.3     11.6     8.6       MCH 30.6      30.1     30.0       MCHC 34.4     33.6     33.2       MCV 89     90     90       Mono # 1.0     1.2     1.2       Mono % 10.5     11.0     11.5       MPV 9.9     10.0     9.9       nRBC 0     0     0       Platelets 174     175     171       POCT Glucose       84         Potassium   4.1             PROTEIN TOTAL   5.5             RBC 2.94     2.79     2.70       RDW 15.7     15.1     15.3       Sodium   123             WBC 9.33     11.20     10.74                              Significant Imaging:  Imaging results within the past 24 hours have been reviewed.    Assessment/Plan:     * GI bleed  With melena per daughter at bedside  Plan for EGD today  Continue PPI  Trend Hgb  NPO        Thank you for your consult. I will follow-up with patient. Please contact us if you have any additional questions.    Bashir Hall MD  Gastroenterology  [facility] Surg

## 2022-09-29 NOTE — PLAN OF CARE
Eddie met with pt and pt's son Jules [phone] at bedside this AM to complete DCA. Pt lives with her  and daughter Jerri [phone]. Pt's son stated that they have a hospital bed, wc, sw, bsc, and rw at home. SW will request f/u appts. White board updated with CM name and contact information.  Discharge brochure provided.  Pt encouraged to call with any questions or concerns.  Cm will continue to follow pt through transitions of care and assist with any discharge needs.    Pts son stated that she is current with [facility]. Sw Sent referral via [employer]. SW will follow.    Marshal SATYA Vega  [phone]       09/29/22 0918   Discharge Assessment   Assessment Type Discharge Planning Assessment   Confirmed/corrected address, phone number and insurance Yes   Confirmed Demographics Correct on Facesheet   Source of Information family;patient   Communicated LISA with patient/caregiver Yes   Reason For Admission GI bleed   Lives With child(darwin), adult;significant other   Facility Arrived From: home   Do you expect to return to your current living situation? Yes   Do you have help at home or someone to help you manage your care at home? Yes   Who are your caregiver(s) and their phone number(s)? daughter Jerri [phone]   Prior to hospitilization cognitive status: Alert/Oriented   Current cognitive status: Alert/Oriented   Walking or Climbing Stairs Difficulty ambulation difficulty, requires equipment   Dressing/Bathing Difficulty bathing difficulty, requires equipment   Equipment Currently Used at Home hospital bed;walker, rolling;wheelchair;bedside commode;shower chair   Readmission within 30 days? No   Patient currently being followed by outpatient case management? No   Do you currently have service(s) that help you manage your care at home? No   Do you take prescription medications? Yes   Do you have prescription coverage? Yes   Coverage medicare   Do you have any problems affording any of your  prescribed medications? No   Is the patient taking medications as prescribed? yes   Who is going to help you get home at discharge? daughter Jerri [phone] and son Jules [phone]   How do you get to doctors appointments? family or friend will provide;health plan transportation   Are you on dialysis? No   Do you take coumadin? No   Discharge Plan A Home Health   DME Needed Upon Discharge  none   Discharge Plan discussed with: Patient   Discharge Barriers Identified None   Physical Activity   On average, how many days per week do you engage in moderate to strenuous exercise (like a brisk walk)? 0 days   On average, how many minutes do you engage in exercise at this level? 0 min   Financial Resource Strain   How hard is it for you to pay for the very basics like food, housing, medical care, and heating? Not hard   Housing Stability   In the last 12 months, was there a time when you were not able to pay the mortgage or rent on time? N   In the last 12 months, was there a time when you did not have a steady place to sleep or slept in a shelter (including now)? N   Transportation Needs   In the past 12 months, has lack of transportation kept you from medical appointments or from getting medications? no   In the past 12 months, has lack of transportation kept you from meetings, work, or from getting things needed for daily living? No   Food Insecurity   Within the past 12 months, you worried that your food would run out before you got the money to buy more. Never true   Within the past 12 months, the food you bought just didn't last and you didn't have money to get more. Never true   Stress   Do you feel stress - tense, restless, nervous, or anxious, or unable to sleep at night because your mind is troubled all the time - these days? Only a littl   Social Connections   In a typical week, how many times do you talk on the phone with family, friends, or neighbors? More than 3   How often do you get together with  friends or relatives? More than 3   How often do you attend Alevism or Tenriism services? Never   Do you belong to any clubs or organizations such as Alevism groups, unions, fraternal or athletic groups, or school groups? No   How often do you attend meetings of the clubs or organizations you belong to? Never   Are you , , , , never , or living with a partner?    Alcohol Use   Q1: How often do you have a drink containing alcohol? Never   Q2: How many drinks containing alcohol do you have on a typical day when you are drinking? None   Q3: How often do you have six or more drinks on one occasion? Never   Relationship/Environment   Name(s) of Who Lives With Patient daughter Jerri [phone] and son Jules [phone]

## 2022-09-29 NOTE — PLAN OF CARE
Pt alert, awake and is disoriented to place, time and situation. Pt does respond to name. Medication administered per MAR. VS signs monitored and recorded. Safety maintained and patient free from falls. Bed at lowest position, locked, and bed alarm set. Instructed patient to call if needed, patient verbalized understanding. Pt's son at bedside. Call light within patient's reach. Will continue to monitor.    Problem: Adult Inpatient Plan of Care  Goal: Plan of Care Review  Outcome: Ongoing, Progressing  Goal: Patient-Specific Goal (Individualized)  Outcome: Ongoing, Progressing  Goal: Absence of Hospital-Acquired Illness or Injury  Outcome: Ongoing, Progressing  Goal: Optimal Comfort and Wellbeing  Outcome: Ongoing, Progressing

## 2022-09-30 PROBLEM — Z51.5 PALLIATIVE CARE ENCOUNTER: Status: ACTIVE | Noted: 2022-01-01

## 2022-09-30 NOTE — PLAN OF CARE
Discharge orders noted. Additional clinical references attached. Patient's discharge instructions given by bedside RN and reviewed by this VN with patient's daughter. Education provided on home care instructions, new and previous medications, diagnosis, when to seek medical attention, and follow-up appointments. Patient's daughter verbalized understanding and teach back method was used. Ride/transportation home set up by CM. All questions answered. Waiting for transportation. Floor nurse notified.

## 2022-09-30 NOTE — DISCHARGE SUMMARY
"[facility] Medicine  Discharge Summary      Patient Name: Kristen Draper  MRN: [id number]  Patient Class: IP- Inpatient  Admission Date: 9/29/2022  Hospital Length of Stay: 1 days  Discharge Date and Time:  09/30/2022 12:55 PM  Attending Physician: Carlos Linda, *   Discharging Provider: Carlos Linda MD  Primary Care Provider: Leyda Alarcon MD      HPI:   Per , "Ms. Draper is an 88yo lady with a past medical history of dementia, TIA, AS, Carotid disease, depression, and anemia.   She was sent to the ED by her PCP after finding a Hg of 5.8 on labs.  Her son provides all the history due to her dementia.  She is DNR.     At home she is on Lopressor, Aggrenox, and Protonix.  In the ED she was heme positive on hemmocult, but she has had no BRBPR or any melena.      BP (!) 98/53   Pulse 79   Temp 98.2 °F (36.8 °C) (Axillary)   Resp 18   Wt 41.7 kg (92 lb)   LMP  (LMP Unknown)   SpO2 100%   Breastfeeding No   BMI 15.79 kg/m².  Labs showed Hg 6.2, , Na 120, Cr 1, BUN 26.     No radiographs were done in the ED.  EKG showed Normal sinus rhythm 80, Left axis deviation, Incomplete right bundle branch block, Nonspecific T wave abnormality now evident in Inferior leads, Confirmed by Osiel REA MD ([id number]) on 9/2/2022 5:45:43 PM     In the ED she was treated with NS 75 cc/he 2015, Protonix 40mg iv then 8mg/hr at 1945.  She was ordered one Unit PRBC to be transufsed to keep Hg >7g."    Seen at the bedside with daughter, reports a few days of abdominal discomfort. She is DNR.      Procedure(s) (LRB):  EGD (ESOPHAGOGASTRODUODENOSCOPY) (N/A)      Hospital Course:   Ms. Draper presented with symptomatic anemia with episodes of melena at home. Initiated on GI bleed pathway with IV protonix. GI consulted and performed EGD on 9/29 with atrophic mucosa in gastrum with few non-bleeding angioectasias, s/p APC. Hb with slight downtick today, but is overall consistent with her " "baseline over the preceding two labs with no ongoing bleeding. Stable for discharge. She is DNR/DNI.    BP (!) 117/58 (Patient Position: Lying)   Pulse 82   Temp 97.6 °F (36.4 °C) (Axillary)   Resp 16   Ht 5' 5" (1.651 m)   LMP  (LMP Unknown)   SpO2 100%   Breastfeeding No   BMI 15.30 kg/m²   Physical Exam  Vitals reviewed.   Constitutional:       General: She is not in acute distress.     Appearance: She is well-developed. She is ill-appearing (chronically). She is not diaphoretic.   HENT:      Head: Normocephalic and atraumatic.      Nose: Nose normal.   Eyes:      General: No scleral icterus.     Pupils: Pupils are equal, round, and reactive to light.   Neck:      Vascular: No JVD.      Trachea: No tracheal deviation.   Cardiovascular:      Rate and Rhythm: Normal rate and regular rhythm.      Heart sounds: Normal heart sounds.   Pulmonary:      Effort: Pulmonary effort is normal. No respiratory distress.      Breath sounds: Normal breath sounds.   Abdominal:      General: There is no distension.      Palpations: Abdomen is soft.      Tenderness: There is no abdominal tenderness.      Comments: PEG   Musculoskeletal:         General: No deformity.      Cervical back: Normal range of motion.   Skin:     General: Skin is warm and dry.      Findings: No rash.   Neurological:      Mental Status: She is alert. She is disoriented.   Psychiatric:      Comments: Unable to assess      CRANIAL NERVES      CN III, IV, VI   Pupils are equal, round, and reactive to light.       Goals of Care Treatment Preferences:  Code Status: DNR      Consults:   Consults (From admission, onward)        Status Ordering Provider     Inpatient consult to Palliative Care  Once        Provider:  (Not yet assigned)    Acknowledged ZE JANSEN     IP consult to case management  Once        Provider:  (Not yet assigned)    Acknowledged MARIA EUGENIA ROSAS     Inpatient consult to Gastroenterology  Once        Provider:  (Not yet " assigned)    Completed DEVANG BURKS new Assessment & Plan notes have been filed under this hospital service since the last note was generated.  Service: Hospital Medicine    Final Active Diagnoses:    Diagnosis Date Noted POA    PRINCIPAL PROBLEM:  GI bleed [K92.2] 09/29/2022 Yes    Hyperkalemia [E87.5] 09/29/2022 Yes    Elevated bilirubin [R17] 09/29/2022 Yes    Goals of care, counseling/discussion [Z71.89] 09/29/2022 Not Applicable    Debility [R53.81] 09/29/2022 Yes    Benign essential hypertension [I10] 09/28/2022 Yes    Hyponatremia [E87.1] 09/28/2022 Yes    Aortic valve stenosis [I35.0] 07/25/2017 Yes      Problems Resolved During this Admission:       Discharged Condition: fair    Disposition: Home or Self Care    Follow Up:   Follow-up Information     Leyda Alarcon MD Follow up.    Specialty: Hospitalist  Why: Nurse practitioner at home program will call with date and time of appt.  Contact information:  [address] [place], [place] [id number] [phone]                       Patient Instructions:      Ambulatory referral/consult to [facility]   Standing Status: Future   Referral Priority: Routine Referral Type: Consultation   Referral Reason: Specialty Services Required   Number of Visits Requested: 1       Significant Diagnostic Studies: see above    Pending Diagnostic Studies:     None         Medications:  Reconciled Home Medications:      Medication List      START taking these medications    aspirin 81 MG Chew  1 tablet (81 mg total) by Per G Tube route once daily.     pantoprazole 40 mg suspension  Commonly known as: PROTONIX  1 packet (40 mg total) by Per G Tube route once daily.  Replaces: pantoprazole 40 MG tablet        CONTINUE taking these medications    atorvastatin 40 MG tablet  Commonly known as: LIPITOR  TAKE 1 TABLET DAILY AT BEDTIME     cholecalciferol (vitamin D3) 50 mcg (2,000 unit) Cap capsule  Commonly known as: VITAMIN D3  1  capsule.     cyanocobalamin 500 MCG tablet  1 tablet.     magnesium oxide 400 mg (241.3 mg magnesium) tablet  Commonly known as: MAG-OX  Take 1 tablet (400 mg total) by mouth once daily.     OLANZapine 7.5 MG tablet  Commonly known as: ZyPREXA  Take 2 tablets (15 mg total) by mouth every evening.     predniSONE 10 MG tablet  Commonly known as: DELTASONE  Take 2 tablets (20 mg total) by mouth once daily.     TylenoL 325 MG tablet  Generic drug: acetaminophen  2 tablets.     venlafaxine 37.5 MG Tab  Commonly known as: EFFEXOR  Take by mouth 2 (two) times daily.     VITAMIN B COMPLEX ORAL  1 capsule.     vitamin E 400 UNIT capsule  1 capsule.        STOP taking these medications    ALPRAZolam 0.25 MG tablet  Commonly known as: XANAX     dipyridamole-aspirin 200-25 mg  mg Cm12  Commonly known as: AGGRENOX     metoprolol succinate 25 MG 24 hr tablet  Commonly known as: TOPROL-XL     midodrine 5 MG Tab  Commonly known as: PROAMATINE     pantoprazole 40 MG tablet  Commonly known as: PROTONIX  Replaced by: pantoprazole 40 mg suspension            Indwelling Lines/Drains at time of discharge:   Lines/Drains/Airways     Drain  Duration                Gastrostomy/Enterostomy 09/29/22 1900 LUQ <1 day                Time spent on the discharge of patient: 33 minutes         Carlos Linda MD  Department of Hospital Medicine  [facility]

## 2022-09-30 NOTE — PLAN OF CARE
[facility]      HOME HEALTH ORDERS  FACE TO FACE ENCOUNTER    Patient Name: Kristen Draper  YOB: 1935    PCP: Leyda Alarcon MD   PCP Address: [address] [place] [address] / [place] [address]  PCP Phone Number: [phone]  PCP Fax: [phone]    Encounter Date: 9/28/22    Admit to Home Health    Diagnoses:  Active Hospital Problems    Diagnosis  POA    *GI bleed [K92.2]  Yes    Hyperkalemia [E87.5]  Yes    Elevated bilirubin [R17]  Yes    Goals of care, counseling/discussion [Z71.89]  Not Applicable    Debility [R53.81]  Yes    Benign essential hypertension [I10]  Yes    Hyponatremia [E87.1]  Yes    Aortic valve stenosis [I35.0]  Yes      Resolved Hospital Problems   No resolved problems to display.       Follow Up Appointments:  No future appointments.    Allergies:  Review of patient's allergies indicates:   Allergen Reactions    Aspirin Nausea Only    Penicillins        Medications: Review discharge medications with patient and family and provide education.    Current Facility-Administered Medications   Medication Dose Route Frequency Provider Last Rate Last Admin    0.9%  NaCl infusion   Intravenous Continuous Leyda Alarcon MD   Stopped at 09/29/22 1449    acetaminophen tablet 650 mg  650 mg Oral Q6H PRN Eleanor Zavala NP        acetaminophen tablet 650 mg  650 mg Per G Tube Q8H PRN Carlos Linda MD   650 mg at 09/29/22 2130    atorvastatin tablet 40 mg  40 mg Oral QHS Carlos Linda MD   40 mg at 09/29/22 2130    magnesium oxide tablet 400 mg  400 mg Oral Daily Carlos Linda MD   400 mg at 09/30/22 0833    melatonin tablet 6 mg  6 mg Oral Nightly PRN Eleanor Zavala NP        olanzapine zydis disintegrating tablet 15 mg  15 mg Oral QHS Carlos Linda MD   15 mg at 09/29/22 2130    ondansetron injection 4 mg  4 mg Intravenous Q6H PRN Eleanor Zavala NP        pantoprazole 40 mg in  mL infusion (ready to mix system)  8 mg/hr  Intravenous Continuous Eleanor Zavala, NP 20 mL/hr at 09/30/22 0551 8 mg/hr at 09/30/22 0551    predniSONE tablet 20 mg  20 mg Oral Daily Carlos Linda MD   20 mg at 09/30/22 0833    venlafaxine tablet 37.5 mg  37.5 mg Oral QHS Carlos Linda MD   37.5 mg at 09/29/22 2130     Current Discharge Medication List        START taking these medications    Details   aspirin 81 MG Chew 1 tablet (81 mg total) by Per G Tube route once daily.  Qty: 30 tablet, Refills: 11      pantoprazole (PROTONIX) 40 mg suspension 1 packet (40 mg total) by Per G Tube route once daily.  Qty: 30 packet, Refills: 11           CONTINUE these medications which have NOT CHANGED    Details   atorvastatin (LIPITOR) 40 MG tablet TAKE 1 TABLET DAILY AT BEDTIME  Qty: 90 tablet, Refills: 3      magnesium oxide (MAG-OX) 400 mg (241.3 mg magnesium) tablet Take 1 tablet (400 mg total) by mouth once daily.  Qty: 90 tablet, Refills: 3      OLANZapine (ZYPREXA) 7.5 MG tablet Take 2 tablets (15 mg total) by mouth every evening.  Qty: 90 tablet, Refills: 1    Associated Diagnoses: Depression, unspecified depression type      predniSONE (DELTASONE) 10 MG tablet Take 2 tablets (20 mg total) by mouth once daily.  Qty: 90 tablet, Refills: 1      venlafaxine (EFFEXOR) 37.5 MG Tab Take by mouth 2 (two) times daily.      acetaminophen (TYLENOL) 325 MG tablet 2 tablets.      cholecalciferol, vitamin D3, (VITAMIN D3) 50 mcg (2,000 unit) Cap capsule 1 capsule.      cyanocobalamin 500 MCG tablet 1 tablet.      VITAMIN B COMPLEX ORAL 1 capsule.      vitamin E 400 UNIT capsule 1 capsule.           STOP taking these medications       ALPRAZolam (XANAX) 0.25 MG tablet Comments:   Reason for Stopping:         dipyridamole-aspirin 200-25 mg (AGGRENOX)  mg CM12 Comments:   Reason for Stopping:         pantoprazole (PROTONIX) 40 MG tablet Comments:   Reason for Stopping:         metoprolol succinate (TOPROL-XL) 25 MG 24 hr tablet Comments:   Reason  for Stopping:         midodrine (PROAMATINE) 5 MG Tab Comments:   Reason for Stopping:                 I have seen and examined this patient within the last 30 days. My clinical findings that support the need for the home health skilled services and home bound status are the following:no   Weakness/numbness causing balance and gait disturbance due to Weakness/Debility making it taxing to leave home.  Patient with medication mismanagement issues requiring home bound status as evidenced by  Poor understanding of medication regimen/dosage.     Diet:   other resume home TF regimen    Labs:  N/a    Referrals/ Consults   to evaluate for community resources/long-range planning.  Aide to provide assistance with personal care, ADLs, and vital signs.    Activities:   activity as tolerated    Nursing:   Agency to admit patient within 24 hours of hospital discharge unless specified on physician order or at patient request    SN to complete comprehensive assessment including routine vital signs. Instruct on disease process and s/s of complications to report to MD. Review/verify medication list sent home with the patient at time of discharge  and instruct patient/caregiver as needed. Frequency may be adjusted depending on start of care date.     Skilled nurse to perform up to 3 visits PRN for symptoms related to diagnosis    Notify MD if SBP > 160 or < 90; DBP > 90 or < 50; HR > 120 or < 50; Temp > 101; O2 < 88%; Other:       Ok to schedule additional visits based on staff availability and patient request on consecutive days within the home health episode.    When multiple disciplines ordered:    Start of Care occurs on Sunday - Wednesday schedule remaining discipline evaluations as ordered on separate consecutive days following the start of care.    Thursday SOC -schedule subsequent evaluations Friday and Monday the following week.     Friday - Saturday SOC - schedule subsequent discipline evaluations on consecutive  days starting Monday of the following week.    For all post-discharge communication and subsequent orders please contact patient's primary care physician. If unable to reach primary care physician or do not receive response within 30 minutes, please contact Steven Mendenhall for clinical staff order clarification    Miscellaneous   PEG Care:  Instruct patient/caregiver to clean site.  Monitor skin integrity.  Routine Skin for Bedridden Patients: Instruct patient/caregiver to apply moisture barrier cream to all skin folds and wet areas in perineal area daily and after baths and all bowel movements.    Home Health Aide:  Nursing Three times weekly, Medical Social Work Three times weekly, and Home Health Aide Three times weekly    Wound Care Orders  Nursing to cleanse sacrum wound with cleansing cloths and apply thin layer of Triad ointment BID and prn cleansing of incontinent episode. Cover dressing not required but may be utilized for pt comfort.    I certify that this patient is confined to her home and needs intermittent skilled nursing care.

## 2022-09-30 NOTE — SUBJECTIVE & OBJECTIVE
Interval History: EGD unremarkable.  Pt to follow up with GI outpatient.    Past Medical History:   Diagnosis Date    Anemia     Anticoagulant long-term use     Anxiety     Aortic stenosis     Arthritis     Bilateral carotid artery disease     Depression     Encounter for blood transfusion     Hemorrhoids     TIA (transient ischemic attack)        Past Surgical History:   Procedure Laterality Date    HYSTERECTOMY         Review of patient's allergies indicates:   Allergen Reactions    Aspirin Nausea Only    Penicillins        Medications:  Continuous Infusions:   sodium chloride 0.9% Stopped (09/29/22 1449)    pantoprazole (PROTONIX) IV infusion Stopped (09/30/22 1121)     Scheduled Meds:   atorvastatin  40 mg Oral QHS    magnesium oxide  400 mg Oral Daily    olanzapine zydis  15 mg Oral QHS    predniSONE  20 mg Oral Daily    venlafaxine  37.5 mg Oral QHS     PRN Meds:acetaminophen, acetaminophen, melatonin, ondansetron    Family History       Problem Relation (Age of Onset)    Cancer Mother          Tobacco Use    Smoking status: Former    Smokeless tobacco: Never   Substance and Sexual Activity    Alcohol use: No    Drug use: No    Sexual activity: Not on file       Review of Systems   Unable to perform ROS: Patient nonverbal   Objective:     Vital Signs (Most Recent):  Temp: 97.6 °F (36.4 °C) (09/30/22 0737)  Pulse: 82 (09/30/22 0737)  Resp: 16 (09/30/22 0737)  BP: (!) 117/58 (09/30/22 0737)  SpO2: 100 % (09/30/22 0737)   Vital Signs (24h Range):  Temp:  [97.6 °F (36.4 °C)-98.6 °F (37 °C)] 97.6 °F (36.4 °C)  Pulse:  [80-96] 82  Resp:  [16-18] 16  SpO2:  [98 %-100 %] 100 %  BP: (104-126)/(44-60) 117/58     Weight: 41.7 kg (91 lb 14.9 oz)  Body mass index is 15.3 kg/m².    Physical Exam  Vitals and nursing note reviewed. Exam conducted with a chaperone present.   Constitutional:       General: She is not in acute distress.     Appearance: She is cachectic. She is ill-appearing (temporal wasting).   HENT:      Head:  Normocephalic.      Mouth/Throat:      Mouth: Mucous membranes are dry.   Cardiovascular:      Rate and Rhythm: Normal rate.      Heart sounds: Murmur heard.   Pulmonary:      Effort: Pulmonary effort is normal.   Abdominal:      General: Abdomen is flat. Bowel sounds are normal.      Comments: PEG tube    Musculoskeletal:         General: Deformity present.      Comments: Contractures    Skin:     General: Skin is dry.      Capillary Refill: Capillary refill takes less than 2 seconds.   Neurological:      Mental Status: Mental status is at baseline. She is disoriented.      Comments: Non verbal    Psychiatric:         Speech: She is noncommunicative.       Review of Symptoms      Symptom Assessment (ESAS 0-10 Scale)  Pain:  0  Dyspnea:  0  Anxiety:  0  Nausea:  0  Depression:  0  Anorexia:  0  Fatigue:  0  Insomnia:  0  Restlessness:  0  Agitation:  0  Unable to complete assessment due to Patient nonverbal     CAM / Delirium:  Negative  Constipation:  Negative  Diarrhea:  Negative      Bowel Management Plan (BMP):  No      Pain Assessment in Advanced Demential Scale (PAINAD)   Breathing - Independent of vocalization:  0  Negative vocalization:  0  Facial expression:  0  Body language:  0  Consolability:  0  Total:  0    Performance Status:  20    Living Arrangements:  Lives with family and Lives in home    Psychosocial/Cultural: Pt lives at home with her daughter.  Pt is bed bound and nonverbal.    Spiritual:  F - Nickie and Belief:  Confucianist   I - Importance:  Moderate   C - Community:  Family support  A - Address in Care:  Pastoral visits PRN      Time-Based Charting:  Yes  Chart Review: 18 minutes  Face to Face: 13 minutes  Symptom Assessment: 12 minutes  Coordination of Care: 15 minutes  Discharge Plannin minutes  Advance Care Plannin minutes  Goals of Care: 9 minutes    Total Time Spent: 89 minutes      Advance Care Planning   Advance Care Planning       Significant Labs: All pertinent labs within the  past 24 hours have been reviewed.  CBC:   Recent Labs   Lab 09/30/22  0501   WBC 10.31   HGB 8.2*   HCT 25.3*   MCV 92        BMP:  Recent Labs   Lab 09/30/22  0501   GLU 52*   *   K 4.0      CO2 14*   BUN 20   CREATININE 0.8   CALCIUM 8.1*     LFT:  Lab Results   Component Value Date    AST 35 09/30/2022    ALKPHOS 82 09/30/2022    BILITOT 1.1 (H) 09/30/2022     Albumin:   Albumin   Date Value Ref Range Status   09/30/2022 2.3 (L) 3.5 - 5.2 g/dL Final     Protein:   Total Protein   Date Value Ref Range Status   09/30/2022 5.1 (L) 6.0 - 8.4 g/dL Final     Lactic acid:   No results found for: LACTATE    Significant Imaging: I have reviewed all pertinent imaging results/findings within the past 24 hours.

## 2022-09-30 NOTE — CONSULTS
[facility] Surg  Palliative Medicine  Consult Note    Patient Name: Kristen Draper  MRN: [id number]  Admission Date: 9/29/2022  Hospital Length of Stay: 1 days  Code Status: DNR   Attending Provider: No att. providers found  Consulting Provider: Gladys Jones NP  Primary Care Physician: Leyda Alarcon MD  Principal Problem:GI bleed    Patient information was obtained from relative(s), past medical records and primary team.      Inpatient consult to Palliative Care  Consult performed by: Gladys Jones NP  Consult ordered by: Carlos Linda MD  Reason for consult: Goals of Care        Assessment/Plan:     Palliative care encounter  At time of initial consult, pt resting in bed with daughter at bedside.  Pt is nonverbal and per daughter is at baseline.  Daughter reports that the pt has been in this state for about the last five years. Daughter reports that pt becomes hypotensive when they attempt to sit her up in the chair for more than about ten minutes.  Pt spends most of her time in bed and is home bound.      Daughter reports that she had recently noticed the change in the pts BP when attempting to sit the pt up and this is what prompted the MD appt that identified the low H&H and thus pt was admitted here.  Daughter reports that she spends most of her day caring for her mother.    Daughter reports that she believes her mother to be over all stable and does not view her health as declining.  Pt receives HH services and the family is very happy with these services and wishes to continue them. We discussed goals of care and family is happy with the pts overall medical status and wishes to continue current treatment plans.    I expressed my concern that pt is in a state of decline and at some point may no longer respond to acute care interventions the way that the family feels she does at this point.  Hospice has been discussed in the past and the daughter is open to hospice when they feel the pt has  "reached the point where she no longer benefits from aggressive treatment options.  At this time, they believe that the pts makes a recovery after each acute care  admission and would like to continue all life prolonging treatmetn options at this time.     Pt is a DNR.    Pt and family will follow up with us in the outpatient palliative care clinic to further discuss goals of care and advanced care planning.           Subjective:     HPI:   Per chart, "Per , "Ms. Draper is an 88yo lady with a past medical history of dementia, TIA, AS, Carotid disease, depression, and anemia.   She was sent to the ED by her PCP after finding a Hg of 5.8 on labs.  Her son provides all the history due to her dementia.  She is DNR.     At home she is on Lopressor, Aggrenox, and Protonix.  In the ED she was heme positive on hemmocult, but she has had no BRBPR or any melena.      BP (!) 98/53   Pulse 79   Temp 98.2 °F (36.8 °C) (Axillary)   Resp 18   Wt 41.7 kg (92 lb)   LMP  (LMP Unknown)   SpO2 100%   Breastfeeding No   BMI 15.79 kg/m².  Labs showed Hg 6.2, , Na 120, Cr 1, BUN 26.     No radiographs were done in the ED.  EKG showed Normal sinus rhythm 80, Left axis deviation, Incomplete right bundle branch block, Nonspecific T wave abnormality now evident in Inferior leads, Confirmed by Osiel REA MD ([id number]) on 9/2/2022 5:45:43 PM     In the ED she was treated with NS 75 cc/he 2015, Protonix 40mg iv then 8mg/hr at 1945.  She was ordered one Unit PRBC to be transufsed to keep Hg >7g."     Seen at the bedside with daughter, reports a few days of abdominal discomfort. She is DNR."      Interval History: EGD unremarkable.  Pt to follow up with GI outpatient.    Past Medical History:   Diagnosis Date    Anemia     Anticoagulant long-term use     Anxiety     Aortic stenosis     Arthritis     Bilateral carotid artery disease     Depression     Encounter for blood transfusion     Hemorrhoids     TIA (transient " ischemic attack)        Past Surgical History:   Procedure Laterality Date    HYSTERECTOMY         Review of patient's allergies indicates:   Allergen Reactions    Aspirin Nausea Only    Penicillins        Medications:  Continuous Infusions:   sodium chloride 0.9% Stopped (09/29/22 1449)    pantoprazole (PROTONIX) IV infusion Stopped (09/30/22 1121)     Scheduled Meds:   atorvastatin  40 mg Oral QHS    magnesium oxide  400 mg Oral Daily    olanzapine zydis  15 mg Oral QHS    predniSONE  20 mg Oral Daily    venlafaxine  37.5 mg Oral QHS     PRN Meds:acetaminophen, acetaminophen, melatonin, ondansetron    Family History       Problem Relation (Age of Onset)    Cancer Mother          Tobacco Use    Smoking status: Former    Smokeless tobacco: Never   Substance and Sexual Activity    Alcohol use: No    Drug use: No    Sexual activity: Not on file       Review of Systems   Unable to perform ROS: Patient nonverbal   Objective:     Vital Signs (Most Recent):  Temp: 97.6 °F (36.4 °C) (09/30/22 0737)  Pulse: 82 (09/30/22 0737)  Resp: 16 (09/30/22 0737)  BP: (!) 117/58 (09/30/22 0737)  SpO2: 100 % (09/30/22 0737)   Vital Signs (24h Range):  Temp:  [97.6 °F (36.4 °C)-98.6 °F (37 °C)] 97.6 °F (36.4 °C)  Pulse:  [80-96] 82  Resp:  [16-18] 16  SpO2:  [98 %-100 %] 100 %  BP: (104-126)/(44-60) 117/58     Weight: 41.7 kg (91 lb 14.9 oz)  Body mass index is 15.3 kg/m².    Physical Exam  Vitals and nursing note reviewed. Exam conducted with a chaperone present.   Constitutional:       General: She is not in acute distress.     Appearance: She is cachectic. She is ill-appearing (temporal wasting).   HENT:      Head: Normocephalic.      Mouth/Throat:      Mouth: Mucous membranes are dry.   Cardiovascular:      Rate and Rhythm: Normal rate.      Heart sounds: Murmur heard.   Pulmonary:      Effort: Pulmonary effort is normal.   Abdominal:      General: Abdomen is flat. Bowel sounds are normal.      Comments: PEG tube     Musculoskeletal:         General: Deformity present.      Comments: Contractures    Skin:     General: Skin is dry.      Capillary Refill: Capillary refill takes less than 2 seconds.   Neurological:      Mental Status: Mental status is at baseline. She is disoriented.      Comments: Non verbal    Psychiatric:         Speech: She is noncommunicative.       Review of Symptoms      Symptom Assessment (ESAS 0-10 Scale)  Pain:  0  Dyspnea:  0  Anxiety:  0  Nausea:  0  Depression:  0  Anorexia:  0  Fatigue:  0  Insomnia:  0  Restlessness:  0  Agitation:  0  Unable to complete assessment due to Patient nonverbal     CAM / Delirium:  Negative  Constipation:  Negative  Diarrhea:  Negative      Bowel Management Plan (BMP):  No      Pain Assessment in Advanced Demential Scale (PAINAD)   Breathing - Independent of vocalization:  0  Negative vocalization:  0  Facial expression:  0  Body language:  0  Consolability:  0  Total:  0    Performance Status:  20    Living Arrangements:  Lives with family and Lives in home    Psychosocial/Cultural: Pt lives at home with her daughter.  Pt is bed bound and nonverbal.    Spiritual:  F - Nickie and Belief:  Presybeterian   I - Importance:  Moderate   C - Community:  Family support  A - Address in Care:  Pastoral visits PRN      Time-Based Charting:  Yes  Chart Review: 18 minutes  Face to Face: 13 minutes  Symptom Assessment: 12 minutes  Coordination of Care: 15 minutes  Discharge Plannin minutes  Advance Care Plannin minutes  Goals of Care: 9 minutes    Total Time Spent: 89 minutes      Advance Care Planning   Advance Care Planning       Significant Labs: All pertinent labs within the past 24 hours have been reviewed.  CBC:   Recent Labs   Lab 22  0501   WBC 10.31   HGB 8.2*   HCT 25.3*   MCV 92        BMP:  Recent Labs   Lab 22  0501   GLU 52*   *   K 4.0      CO2 14*   BUN 20   CREATININE 0.8   CALCIUM 8.1*     LFT:  Lab Results   Component Value Date     AST 35 09/30/2022    ALKPHOS 82 09/30/2022    BILITOT 1.1 (H) 09/30/2022     Albumin:   Albumin   Date Value Ref Range Status   09/30/2022 2.3 (L) 3.5 - 5.2 g/dL Final     Protein:   Total Protein   Date Value Ref Range Status   09/30/2022 5.1 (L) 6.0 - 8.4 g/dL Final     Lactic acid:   No results found for: LACTATE    Significant Imaging: I have reviewed all pertinent imaging results/findings within the past 24 hours.        Gladys Jones NP  Palliative Medicine  [facility]    Advance Care Planning     Date: 09/30/2022    [facility]  I engaged the family in a conversation about advance care planning and we specifically addressed what the goals of care would be moving forward, in light of the patient's change in clinical status, specifically advanced dementia, debility.  We did not specifically address the patient's likely prognosis, which is poor.  We explored the patient's values and preferences for future care.  The family endorses that what is most important right now is to focus on spending time at home, extending life as long as possible, even it it means sacrificing quality and improvement in condition but with limits to invasive therapies    Accordingly, we have decided that the best plan to meet the patient's goals includes continuing with treatment    I did explain the role for hospice care at this stage of the patient's illness, including its ability to help the patient live with the best quality of life possible.  We will not be making a hospice referral.    I spent a total of 18 minutes engaging the patient in this advance care planning discussion.

## 2022-09-30 NOTE — ASSESSMENT & PLAN NOTE
At time of initial consult, pt resting in bed with daughter at bedside.  Pt is nonverbal and per daughter is at baseline.  Daughter reports that the pt has been in this state for about the last five years. Daughter reports that pt becomes hypotensive when they attempt to sit her up in the chair for more than about ten minutes.  Pt spends most of her time in bed and is home bound.      Daughter reports that she had recently noticed the change in the pts BP when attempting to sit the pt up and this is what prompted the MD appt that identified the low H&H and thus pt was admitted here.  Daughter reports that she spends most of her day caring for her mother.    Daughter reports that she believes her mother to be over all stable and does not view her health as declining.  Pt receives  services and the family is very happy with these services and wishes to continue them. We discussed goals of care and family is happy with the pts overall medical status and wishes to continue current treatment plans.    I expressed my concern that pt is in a state of decline and at some point may no longer respond to acute care interventions the way that the family feels she does at this point.  Hospice has been discussed in the past and the daughter is open to hospice when they feel the pt has reached the point where she no longer benefits from aggressive treatment options.  At this time, they believe that the pts makes a recovery after each acute care  admission and would like to continue all life prolonging treatmetn options at this time.     Pt is a DNR.    Pt and family will follow up with us in the outpatient palliative care clinic to further discuss goals of care and advanced care planning.

## 2022-09-30 NOTE — PLAN OF CARE
Recommendations  1) Advance PO diet to regular- texture per SLP   2) Boost plus TID   3) weigh weekly   4) feeding assistance if needed    Goals: 1) PO intakes > 50% of meals and supplements at f/u  Nutrition Goal Status: new  Communication of RD Recs:  (POC, sticky note)

## 2022-09-30 NOTE — PROGRESS NOTES
[place] [facility]  Adult Nutrition  Progress Note    SUMMARY       Recommendations  1) Advance PO diet to regular- texture per SLP   2) Boost plus TID   3) weigh weekly   4) feeding assistance if needed    Goals: 1) PO intakes > 50% of meals and supplements at f/u  Nutrition Goal Status: new  Communication of RD Recs:  (POC, sticky note)    Assessment and Plan    Underweight  R/t limited ability to perform nutrition related ADLs, AMS  As evidenced by BMI < 18 kg/m2, slow > 40% wt loss x 6 years  Intervention: general healthful diet and nutrition supplement therapy, feeding assistance   new    Malnutrition Assessment     Skin (Micronutrient):  (Chema = 12, sacraul and trochanter shearing)  Teeth (Micronutrient):  (missing some)   Micronutrient Evaluation: suspected deficiency  Micronutrient Evaluation Comments: Na, check iron, vitamin C/zinc               Edema (Fluid Accumulation): 0-->no edema present             Reason for Assessment    Reason For Assessment: identified at risk by screening criteria  Diagnosis:  (GIB)  Relevant Medical History: dementia, TIA, AS, depression, anemia, Cartoid disease  Interdisciplinary Rounds: did not attend (remote)    General Information Comments: 88 y/o female admitted with GIB, s/p EGD with no ongoing bleeding. Plan for discharge today. PO intakes 25% s/p NPO x 1 day. NFPE to be done by on-site RD at f.u noted to be underweight. slow wt loss since 2017 ( 80 lb loss).    Nutrition Discharge Planning: to be determined- regular diet, texture per SLP + Boost plus TID    Nutrition Risk Screen    Nutrition Risk Screen: large or nonhealing wound, burn or pressure injury    Nutrition/Diet History    Spiritual, Cultural Beliefs, Confucianism Practices, Values that Affect Care: no  Food Allergies: NKFA  Factors Affecting Nutritional Intake: impaired cognitive status/motor control, decreased appetite, NPO    Anthropometrics    Temp: 97.6 °F (36.4 °C)  Height Method: Measured  Height: 5'  "5"  Height (inches): 65 in  Weight Method: Bed Scale  Weight: 41.7 kg (91 lb 14.9 oz)  Weight (lb): 91.93 lb  Ideal Body Weight (IBW), Female: 125 lb  % Ideal Body Weight, Female (lb): 73.54 %  BMI (Calculated): 15.3  BMI Grade: less than 16 protein-energy malnutrition grade III  Weight Loss: unintentional  Usual Body Weight (UBW), k kg (2017)  % Usual Body Weight: 53.57  % Weight Change From Usual Weight: -46.54 %       Lab/Procedures/Meds    Pertinent Labs Reviewed: reviewed  BMP  Lab Results   Component Value Date     (L) 2022    K 4.0 2022     2022    CO2 14 (L) 2022    BUN 20 2022    CREATININE 0.8 2022    CALCIUM 8.1 (L) 2022    ANIONGAP 11 2022    ESTGFRAFRICA 43.2 (A) 2021    EGFRNONAA 37.5 (A) 2021     Lab Results   Component Value Date    ALBUMIN 2.3 (L) 2022       Pertinent Medications Reviewed: reviewed  Pertinent Medications Comments: statin, prednisone, pantoprazole, zofran, NS 1-35 ml/hr    Estimated/Assessed Needs    Weight Used For Calorie Calculations: 41.7 kg (91 lb 14.9 oz)  Energy Calorie Requirements (kcal): MSJ ( x 1.5 wt gain) = 1285 kcal  Energy Need Method: Bautista-St Saenz  Protein Requirements: 1.2-1.5 g protein/kg ( age/wound) = 50-62 g  Weight Used For Protein Calculations: 41.7 kg (91 lb 14.9 oz)  Fluid Requirements (mL): 1300 ml or per MD  Estimated Fluid Requirement Method: RDA Method  CHO Requirement: N/A      Nutrition Prescription Ordered    Current Diet Order: was NPO x 1 day    Evaluation of Received Nutrient/Fluid Intake    Energy Calories Required: not meeting needs  Protein Required: not meeting needs  Fluid Required: not meeting needs  Tolerance: tolerating  % Intake of Estimated Energy Needs: 0-25%  % Meal Intake: 0-25%    Nutrition Risk    Level of Risk/Frequency of Follow-up: moderate 2 x weekly    Monitor and Evaluation    Food and Nutrient Intake: energy intake, food and beverage " intake  Food and Nutrient Adminstration: diet order  Physical Activity and Function: nutrition-related ADLs and IADLs  Anthropometric Measurements: weight  Biochemical Data, Medical Tests and Procedures: electrolyte and renal panel, gastrointestinal profile  Nutrition-Focused Physical Findings: overall appearance, extremities, muscles and bones, skin     Nutrition Follow-Up    RD Follow-up?: Yes

## 2022-09-30 NOTE — PLAN OF CARE
Pts safety maintained, bed alarm on, call bell in reach. Meds given per MAR per G tube. Protonix infusing. Pain relieved with PRN Tylenol. No N/V, SOB, distress during night. Vitals stable.

## 2022-09-30 NOTE — PLAN OF CARE
MIKEY met with pt and pt's daughter Jerri [phone] at bedside to discuss final d/c planning. Pt was approved for [facility] Phone: [phone], sw spoke with Tatum she confirmed that they received HH orders. Pt's daughter signed Pt choice forms. Pt has f/u appts listed on avs, sw requested NP at home appt.. Rounds completed on pt.  All questions addressed.  Bedside nurse to discuss d/c medications.  Discussed importance to attend all f/u appts and take medications as prescribed.  Verbalized understanding.    MIKEY spoke with pt's son Jules he confirmed that someone will be home to accept the pt. Mikey set transport for 12:30pm.     SATYA Pardo  [phone]    Future Appointments   Date Time Provider Department Center   10/6/2022  9:30 AM Kimberli José NP [facility]        09/30/22 1100   Final Note   Assessment Type Final Discharge Note   Anticipated Discharge Disposition Home-Health   What phone number can be called within the next 1-3 days to see how you are doing after discharge? [phone]   Hospital Resources/Appts/Education Provided Appointments scheduled and added to AVS   Post-Acute Status   Post-Acute Authorization Home Health   Home Health Status Set-up Complete/Auth obtained   Coverage Medicare   Discharge Delays None known at this time

## 2022-09-30 NOTE — PLAN OF CARE
SW attempted to meet with pt for final d/c. Pt was sleeping and could not be roused. SW sent HH orders to [facility].  MIKEY will follow.    SATYA Pardo  [phone]    Future Appointments   Date Time Provider Department Center   10/6/2022  9:30 AM Kimberli José NP [facility]        09/30/22 0942   Post-Acute Status   Post-Acute Authorization Home Health   Home Health Status Referrals Sent   Coverage MEDICARE   Discharge Plan   Discharge Plan A Home Health

## 2022-09-30 NOTE — MEDICAL/APP STUDENT
"[facility] Medicine Student   Progress Note  Networked reference to record PCT     Admit Date: 9/29/2022  Hospital Day: 1  09/30/2022  8:43 AM    SUBJECTIVE:   Ms. Kristen Draper is a 87 y.o. female with a relevant medical history of  TIA, AS, Carotid disease, depression, and anemia who is being followed up for GI bleed.    HPI: Last week patient's Hb was noted to be decreased when measured during her home yoon visit. Upon reviewing her labs, the patient's PCP noticed this and instructed the patient to report to the [facility].    ED Course as of 09/28/22 2223   Wed Sep 28, 2022   1858 Hemoglobin(!): 6.2 [NA]   1858 Hematocrit(!!): 19.3 [NA]   1906 Will transfuse 1 unit of PRBC [NA]   1917 Occult Blood(!): Positive [NA]   1918 Sodium(!): 120 [NA]   1918 Chloride(!): 90 [NA]   1934 RRC placed.  Will give protonix infusion and injection. Consult nephrology regarding hyponatremia [NA]   2001 Spoke with nephrology, Dr. Villalba, will give slow fluid correction with NS and blood, hyponatremia will liklely correct on its own.  Will monitor for seizure activity vs worsening mental status   [NA]   2026 Spoke with Dr. Marquez at [facility] [NA]     On the evening of 9/28, the patient was transferred on [facility]. Per the patient's son, the patient had an episode of BM in the ED significant for melena.    Interval history: Patient was seen by GI team yesterday and EGD was performed (see "Diagnostic tests" section below). Per RN, at 645 pm, the patient was noted to be disoriented.       Patient noted to be somnolent and disoriented. Daughter by bedside. Daughter stated that patient is expected to be discharged today and they are waiting for transportation.    Daughter told me that the patient had a large bowel movement this morning, which was significant for dark-tarry quality, suggesting melena.    Went over EGD findings with the daughter.    Review of Systems   Constitutional: Negative.    HENT: Negative.   "   Eyes: Negative.    Respiratory:  Positive for shortness of breath. Negative for cough, hemoptysis, sputum production and wheezing.    Cardiovascular:  Positive for palpitations. Negative for chest pain, orthopnea, claudication, leg swelling and PND.   Gastrointestinal:  Positive for abdominal pain, diarrhea and melena. Negative for blood in stool, constipation, heartburn, nausea and vomiting.   Genitourinary: Negative.    Musculoskeletal:  Positive for joint pain. Negative for back pain, falls, myalgias and neck pain.   Skin:  Positive for rash. Negative for itching.   Neurological: Negative.    Endo/Heme/Allergies: Negative.    Psychiatric/Behavioral: Negative.       Please refer to the H&P for past medical, family, and social history.    OBJECTIVE:     Vital Signs Recent:  Temp: 97.6 °F (36.4 °C) (09/30/22 0737)  Pulse: 82 (09/30/22 0737)  Resp: 16 (09/30/22 0737)  BP: (!) 117/58 (09/30/22 0737)  SpO2: 100 % (09/30/22 0737)    Oxygen Documentation:                O2 Device (Oxygen Therapy): room air         Vital Signs Range (Last 24H):  Temp:  [97.5 °F (36.4 °C)-98.6 °F (37 °C)]   Pulse:  [75-96]   Resp:  [16-18]   BP: (104-126)/(44-60)   SpO2:  [98 %-100 %]        I & O (Last 24H):  Intake/Output Summary (Last 24 hours) at 9/30/2022 0843  Last data filed at 9/30/2022 0525  Gross per 24 hour   Intake 1233.74 ml   Output --   Net 1233.74 ml        Physical Exam:  Physical Exam  Constitutional:       General: She is not in acute distress.     Appearance: She is ill-appearing. She is not toxic-appearing or diaphoretic.   HENT:      Head: Normocephalic and atraumatic.      Nose: Nose normal.   Eyes:      General: No scleral icterus.        Right eye: No discharge.         Left eye: No discharge.   Neck:      Vascular: No carotid bruit.   Cardiovascular:      Rate and Rhythm: Normal rate and regular rhythm.      Pulses: Normal pulses.      Heart sounds: Murmur (Holosystolic (crescendo-decrescendo)) heard.     No  friction rub. No gallop.   Pulmonary:      Effort: Pulmonary effort is normal. No respiratory distress.      Breath sounds: No stridor. Wheezing, rhonchi and rales present.   Chest:      Chest wall: No tenderness.   Abdominal:      General: There is no distension.      Palpations: Abdomen is soft. There is no mass.      Tenderness: There is no abdominal tenderness. There is no right CVA tenderness, left CVA tenderness, guarding or rebound.      Hernia: No hernia is present.   Musculoskeletal:         General: No swelling, tenderness, deformity or signs of injury.      Right lower leg: No edema.      Left lower leg: No edema.   Skin:     General: Skin is warm.      Capillary Refill: Capillary refill takes 2 to 3 seconds.      Coloration: Skin is not jaundiced or pale.      Findings: Bruising and lesion (Sacral pressure-wound with dressing) present. No erythema or rash.   Neurological:      Mental Status: Mental status is at baseline. She is disoriented.   Psychiatric:      Comments: At baseline       Labs:   Recent Labs   Lab 09/29/22  0344 09/29/22  1148 09/30/22  0501   *  121* 123* 126*   K 5.4*  5.0 4.1 4.0   CL 94*  94* 96 101   CO2 15*  16* 19* 14*   BUN 23  23 22 20   CREATININE 0.9  0.9 0.9 0.8   GLU 81  82 78 52*   CALCIUM 8.0*  8.0* 8.2* 8.1*   Na is trending up  Bicarb is trending down  Glucose was low (measured at 5 am)  Ca downtrending but around baseline      Recent Labs   Lab 09/28/22  1843 09/29/22  0344 09/29/22  1148 09/30/22  0501   ALKPHOS 89 81  84 80 82   ALT 42 30  30 28 26   AST 46* 50*  43* 35 35   ALBUMIN 2.4* 2.4*  2.5* 2.5* 2.3*   PROT 5.9* 5.7*  5.3* 5.5* 5.1*   BILITOT 0.4 1.6*  1.7* 1.3* 1.1*   INR 0.9  --   --   --    Albumin low but around baseline  Total protein downtrending  Total Bili is downtrending but elevated (GI bleed -> bilirubin/ heme absorbtion from lumen -> enterohepatic circulation)    Recent Labs   Lab 09/29/22  0749 09/29/22  1330 09/30/22  0505    WBC 11.20 9.33 10.31   HGB 8.4* 9.0* 8.2*   HCT 25.0* 26.2* 25.3*    174 172   Hb increased on 9/29 after transfusion on 9/28. But, down on 9/30. (Ongoing bleed vs dillutional)  Hct increased on 9/29 after transfusion on 9/28. But, down on 9/30. (Ongoing bleed vs dillutional)    Diagnostic Results:    9/29    EGD:  - Benign-appearing, intrinsic moderate stenosis was found at the gastroesophageal junction  - The stenosis was traversed after dilation. Finding was dilated with gastroscope. Estimated blood loss was minimal.   - A small hiatal hernia   - Mucosal atrophy in gastric fundus and gastric body  - Few small angioectasias without bleeding were found in the 2nd and 3rd portions of the duodenum   - Coagulation for tissue destruction using argon plasma at 2 liters/minute and 25 dykes was successful. Estimated blood loss was minimal.   - No specimens collected    Scheduled Meds:   atorvastatin  40 mg Oral QHS    magnesium oxide  400 mg Oral Daily    olanzapine zydis  15 mg Oral QHS    predniSONE  20 mg Oral Daily    venlafaxine  37.5 mg Oral QHS     Continuous Infusions:   sodium chloride 0.9% Stopped (09/29/22 1449)    pantoprazole (PROTONIX) IV infusion 8 mg/hr (09/30/22 0578)     PRN Meds:acetaminophen, acetaminophen, melatonin, ondansetron    ASSESSMENT/PLAN:   Ms. Kristen Draper is a 87 y.o. female with a relevant medical history of TIA, AS, Carotid disease, depression, and anemia who is being followed up for GI bleed.    Active Hospital Problems    Diagnosis  POA    *GI bleed [K92.2]  Yes    Hyperkalemia [E87.5]  Yes    Elevated bilirubin [R17]  Yes    Goals of care, counseling/discussion [Z71.89]  Not Applicable    Debility [R53.81]  Yes    Benign essential hypertension [I10]  Yes    Hyponatremia [E87.1]  Yes    Aortic valve stenosis [I35.0]  Yes      Resolved Hospital Problems   No resolved problems to display.       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 Given her severe AS, it is possible that high-shear stress of traversing blood causes unwinding of vWF, which exposes the cleavage site for ADAMTS-13. Smaller cleavage products are unable to bind subendothelium and platelets, thus leading to impaired primary hemostasis (platelet plug).  Additionally, it has been postulated that since AS leads to reduction of CO, which then leads to underperfusion of the GIT -> there is increased expression of VEGF in the GIT wall -> leading to formation of fragile and tortuous vessels that are easy to bleed. On EGD on 9/29, the patient was found to have 2 non-bleeding angioectasias. Just because they were not bleeding at the time of exam doesn't rule out that these could be the sources of her GI bleed. Angiodysplasia is most commonly seen in the cecum (37%) due to the highest-wall stress throughout the GIT per "https://www.[employer]/contents/angiodysplasia-of-the-gastrointestinal-tract?search=heyde%20syndrome&source=search_result&selectedTitle=1~8&usage_type=default&display_rank=1#H12"    PLAN:  - The Platelet Function Assay (PFA) is the preferred screening modality to test for acquired von Willebrand syndrome (AVWS), as seen in Heyde syndrome.  - If PFA study is abnormal -> VWF multimer analysis is necessary to evaluate for reduced levels of high molecular weight multimers (HMWM).  - In accordance with expert consensus, aortic valve replacement (AVR) is the first-line recommended therapy for Heyde syndrome, and patients with Heyde syndrome who undergo AVR, the prognosis is excellent. However, the patient is not a likely candidate for surgery. Per the patient's son, she was scheduled to have an AVR in the past, but surgery was cancelled due to abnormal Renal function tests before the procedure.  - Consider outpatient colonoscopy to evaluate for angiodysplasia, but unlikely benefit and likely harm due to patient's " frailty and clinical status  - GI and Cardiology consult for management of Heyde's syndrome, as recurrent GI-bleeding can be life-threatening  - GI rec's after EGD: resume previous diet, continue present meds, observe course.  - Hold anticoagulation  - Hold ASA, NSAID's  - Maintain 2 large-bore IV's  - Trend CBC, Transfuse if Hb < 7, transfuse platelets if < 50K  - Continue IV Protonix      B) Peptic ulcer disease (Less likely)  ASSESSMENT:  Patient taking Prednisone and ASA-containing Aggrenox. Possible combined effects of steroid-induced tissue fragility + ASA-induced reduction of constitutively expressed KIRBY-1 and thus reduction of PGE-2, PGI-2. Which then causes impaired mucus and bicarb secretion for surface-protection of gastric mucosa as well as reduced NO production -> causing impaired perfusion of the gastric mucosa. Together causing increased susceptibility to injury. Additionally, patient receiving PEG feeds and occasionally takes medications PO. PEG-feeds combined with reduced PO feeds can contribute to gastric mucosal atrophy and weakening due to lack of trophic-stimulating signals from PO intake. EGD on 9/29 found Mucosal atrophy in gastric fundus and gastric body as well as few small angioectasias without bleeding. No signs suggesting ulcer disease were found.    C) PEG irritation (less likely)  ASSESSMENT: EGD showed intact G-tube in gastric body. No comment on erythema, bleeding or ulceration at the G-tube site.    2. Debility  ASSESSMENT: Significant debility. Patient disoriented x3 this morning. Presence of sacral bed-sore. Wound care has seen her and is following.      PLAN:  - Continue G-tube feeds  - Follow sacral wound    3. Goals of care Advanced care planning  PLAN:  - Advanced care consult placed  - Patient's family expressed that the patient's wishes include a natural, peaceful dath  - Status DNR     4. VTE prophylaxis  PLAN:  - Hold anticoagulation in setting of GI bleed      Discharge  planning:   Discharge today upon availability of transportation.

## 2022-09-30 NOTE — HOSPITAL COURSE
"Ms. Draper presented with symptomatic anemia with episodes of melena at home. Initiated on GI bleed pathway with IV protonix. GI consulted and performed EGD on 9/29 with atrophic mucosa in gastrum with few non-bleeding angioectasias, s/p APC. Hb with slight downtick today, but is overall consistent with her baseline over the preceding two labs with no ongoing bleeding. Stable for discharge. She is DNR/DNI.    BP (!) 117/58 (Patient Position: Lying)   Pulse 82   Temp 97.6 °F (36.4 °C) (Axillary)   Resp 16   Ht 5' 5" (1.651 m)   LMP  (LMP Unknown)   SpO2 100%   Breastfeeding No   BMI 15.30 kg/m²   Physical Exam  Vitals reviewed.   Constitutional:       General: She is not in acute distress.     Appearance: She is well-developed. She is ill-appearing (chronically). She is not diaphoretic.   HENT:      Head: Normocephalic and atraumatic.      Nose: Nose normal.   Eyes:      General: No scleral icterus.     Pupils: Pupils are equal, round, and reactive to light.   Neck:      Vascular: No JVD.      Trachea: No tracheal deviation.   Cardiovascular:      Rate and Rhythm: Normal rate and regular rhythm.      Heart sounds: Normal heart sounds.   Pulmonary:      Effort: Pulmonary effort is normal. No respiratory distress.      Breath sounds: Normal breath sounds.   Abdominal:      General: There is no distension.      Palpations: Abdomen is soft.      Tenderness: There is no abdominal tenderness.      Comments: PEG   Musculoskeletal:         General: No deformity.      Cervical back: Normal range of motion.   Skin:     General: Skin is warm and dry.      Findings: No rash.   Neurological:      Mental Status: She is alert. She is disoriented.   Psychiatric:      Comments: Unable to assess      CRANIAL NERVES      CN III, IV, VI   Pupils are equal, round, and reactive to light.  "

## 2022-11-08 NOTE — PROGRESS NOTES
Subjective:       Patient ID: Kristen Draper is a 87 y.o. female.    Chief Complaint: Establish care.     87F with PMH of severe AS with RUBIA 0.8 cm2, carotid atherosclerosis, TIA, unspecified dementia who was recently () admitted for symptomatic anemia 2/2 bleeding gastric angioectasias which were cauterized during EGD and pt was discharge to home in good condition with DNR/DNI status. Was referred for [facility] home [facility] services. Pt comes from home with her daughter Jerri and at baseline is reportedly able to state her name and  but otherwise has progressive dementia and is frequently nonverbal.    Per daughter, Keo, Pt is now under the care of [facility] hospice with wound care for her sacral ulcer. No more melena reported by family. Pt is bedbound but comfortable with occasional use of PRN oxycodone tabs provided by hospice.    Caretaker expresses no unmet care needs at this time. Amenable to followup prn, defers further scheduled visits at this time.    Review of Systems   Unable to perform ROS: Patient nonverbal   Constitutional:  Positive for activity change, appetite change and fatigue.   Respiratory: Negative.     Cardiovascular: Negative.    Gastrointestinal: Negative.        Objective:      Physical Exam     Unavailable - audio only visit with caretaker for pt who is nonverbal at this time    Review of Symptoms      Symptom Assessment (ESAS 0-10 Scale)  Pain:  0  Dyspnea:  0  Anxiety:  0  Nausea:  0  Depression:  0  Anorexia:  3  Fatigue:  5  Insomnia:  0  Restlessness:  0  Agitation:  0     CAM / Delirium:  Negative      ECOG Performance Status rdGrdrrdarddrderd:rd rd3rd Living Arrangements:  Lives in home and Lives with family    Psychosocial/Cultural: Pt has advanced dementia and is now under home hospice.    Spiritual:  F - Nickie and Belief:  Yazidi  I - Importance:  Moderate  C - Community:  Local [employer] group  A - Address in Care:  Pastoral visits prn       Time-Based Charting:  Yes  Chart  Review: 5 minutes  Symptom Assessment: 5 minutes  Coordination of Care: 5 minutes  Advance Care Plannin minutes  Goals of Care: 2 minutes    Total Time Spent: 19 minutes      Advance Care Planning   Advance Directives:   Living Will: No        Oral Declaration: No    LaPOST: No    Do Not Resuscitate Status: No    Medical Power of : No        Oral Declaration: No    Agent's Name:  Keo Rowland (daughter)   Agent's Contact Number:  [phone]    Decision Making:  Family answered questions and Patient unable to communicate due to disease severity/cognitive impairment  Goals of Care: The family endorses that what is most important right now is to focus on spending time at home, avoiding the hospital, remaining as independent as possible, symptom/pain control, and quality of life, even if it means sacrificing a little time    Accordingly, we have decided that the best plan to meet the patient's goals includes continuing with hospice care.       Assessment:       1. Goals of care, counseling/discussion    2. Cerebral infarction, unspecified mechanism        Plan:       Diagnoses and all orders for this visit:    Goals of care, counseling/discussion  Cerebral infarction, unspecified mechanism  - now under home hospice  - defer further orders to hospice team      RTC PRN if requiring additional support          ---    Audio Only Telehealth Visit     The patient location is: home  The chief complaint leading to consultation is: establish care  Visit type: Virtual visit with audio only (telephone)  Total time spent with patient: 15 minutes     The reason for the audio only service rather than synchronous audio and video virtual visit was related to technical difficulties or patient preference/necessity.     Each patient to whom I provide medical services by telemedicine is:  (1) informed of the relationship between the physician and patient and the respective role of any other health care provider with  respect to management of the patient; and (2) notified that they may decline to receive medical services by telemedicine and may withdraw from such care at any time. Patient verbally consented to receive this service via voice-only telephone call.     This service was not originating from a related E/M service provided within the previous 7 days nor will  to an E/M service or procedure within the next 24 hours or my soonest available appointment.  Prevailing standard of care was able to be met in this audio-only visit.

## 2023-01-02 PROBLEM — K92.2 GI BLEED: Status: RESOLVED | Noted: 2022-01-01 | Resolved: 2023-01-01
